# Patient Record
Sex: FEMALE | Race: WHITE
[De-identification: names, ages, dates, MRNs, and addresses within clinical notes are randomized per-mention and may not be internally consistent; named-entity substitution may affect disease eponyms.]

---

## 2018-01-10 ENCOUNTER — HOSPITAL ENCOUNTER (INPATIENT)
Dept: HOSPITAL 45 - C.EDA | Age: 78
LOS: 6 days | Discharge: NURSING, REHAB OR LONG-TERM CARE | DRG: 62 | End: 2018-01-16
Attending: HOSPITALIST | Admitting: HOSPITALIST
Payer: COMMERCIAL

## 2018-01-10 VITALS — HEART RATE: 82 BPM | OXYGEN SATURATION: 94 % | SYSTOLIC BLOOD PRESSURE: 135 MMHG | DIASTOLIC BLOOD PRESSURE: 64 MMHG

## 2018-01-10 VITALS — SYSTOLIC BLOOD PRESSURE: 126 MMHG | HEART RATE: 68 BPM | OXYGEN SATURATION: 95 % | DIASTOLIC BLOOD PRESSURE: 72 MMHG

## 2018-01-10 VITALS
HEART RATE: 85 BPM | OXYGEN SATURATION: 89 % | DIASTOLIC BLOOD PRESSURE: 79 MMHG | SYSTOLIC BLOOD PRESSURE: 154 MMHG | TEMPERATURE: 97.7 F

## 2018-01-10 VITALS — HEART RATE: 83 BPM | OXYGEN SATURATION: 95 % | SYSTOLIC BLOOD PRESSURE: 153 MMHG | DIASTOLIC BLOOD PRESSURE: 91 MMHG

## 2018-01-10 VITALS
WEIGHT: 167.33 LBS | HEIGHT: 62 IN | WEIGHT: 167.33 LBS | HEIGHT: 62 IN | BODY MASS INDEX: 30.79 KG/M2 | BODY MASS INDEX: 30.79 KG/M2

## 2018-01-10 VITALS — SYSTOLIC BLOOD PRESSURE: 165 MMHG | HEART RATE: 80 BPM | OXYGEN SATURATION: 94 % | DIASTOLIC BLOOD PRESSURE: 78 MMHG

## 2018-01-10 VITALS — DIASTOLIC BLOOD PRESSURE: 77 MMHG | OXYGEN SATURATION: 89 % | HEART RATE: 80 BPM | SYSTOLIC BLOOD PRESSURE: 159 MMHG

## 2018-01-10 VITALS — HEART RATE: 94 BPM | DIASTOLIC BLOOD PRESSURE: 94 MMHG | OXYGEN SATURATION: 98 % | SYSTOLIC BLOOD PRESSURE: 153 MMHG

## 2018-01-10 VITALS — DIASTOLIC BLOOD PRESSURE: 80 MMHG | OXYGEN SATURATION: 97 % | SYSTOLIC BLOOD PRESSURE: 146 MMHG | HEART RATE: 81 BPM

## 2018-01-10 VITALS — SYSTOLIC BLOOD PRESSURE: 131 MMHG | DIASTOLIC BLOOD PRESSURE: 64 MMHG | OXYGEN SATURATION: 97 % | HEART RATE: 74 BPM

## 2018-01-10 VITALS — HEART RATE: 92 BPM | SYSTOLIC BLOOD PRESSURE: 162 MMHG | DIASTOLIC BLOOD PRESSURE: 96 MMHG

## 2018-01-10 VITALS — OXYGEN SATURATION: 98 % | DIASTOLIC BLOOD PRESSURE: 78 MMHG | TEMPERATURE: 98.42 F | SYSTOLIC BLOOD PRESSURE: 163 MMHG

## 2018-01-10 VITALS — OXYGEN SATURATION: 99 %

## 2018-01-10 DIAGNOSIS — E87.2: ICD-10-CM

## 2018-01-10 DIAGNOSIS — G81.94: ICD-10-CM

## 2018-01-10 DIAGNOSIS — N18.3: ICD-10-CM

## 2018-01-10 DIAGNOSIS — I73.9: ICD-10-CM

## 2018-01-10 DIAGNOSIS — F17.200: ICD-10-CM

## 2018-01-10 DIAGNOSIS — R29.704: ICD-10-CM

## 2018-01-10 DIAGNOSIS — Z82.49: ICD-10-CM

## 2018-01-10 DIAGNOSIS — E78.5: ICD-10-CM

## 2018-01-10 DIAGNOSIS — K21.9: ICD-10-CM

## 2018-01-10 DIAGNOSIS — M54.9: ICD-10-CM

## 2018-01-10 DIAGNOSIS — I63.9: Primary | ICD-10-CM

## 2018-01-10 DIAGNOSIS — Z79.82: ICD-10-CM

## 2018-01-10 DIAGNOSIS — I12.9: ICD-10-CM

## 2018-01-10 LAB
BASOPHILS # BLD: 0.04 K/UL (ref 0–0.2)
BASOPHILS NFR BLD: 0.2 %
BUN SERPL-MCNC: 16 MG/DL (ref 7–18)
CALCIUM SERPL-MCNC: 8.9 MG/DL (ref 8.5–10.1)
CK MB SERPL-MCNC: 0.5 NG/ML (ref 0.5–3.6)
CO2 SERPL-SCNC: 29 MMOL/L (ref 21–32)
CREAT SERPL-MCNC: 0.85 MG/DL (ref 0.6–1.2)
EOS ABS #: 0.05 K/UL (ref 0–0.5)
EOSINOPHIL NFR BLD AUTO: 262 K/UL (ref 130–400)
EOSINOPHIL NFR BLD AUTO: 279 K/UL (ref 130–400)
GLUCOSE SERPL-MCNC: 142 MG/DL (ref 70–99)
HCT VFR BLD CALC: 41.2 % (ref 37–47)
HCT VFR BLD CALC: 41.6 % (ref 37–47)
HGB BLD-MCNC: 13.9 G/DL (ref 12–16)
HGB BLD-MCNC: 14 G/DL (ref 12–16)
IG#: 0.09 K/UL (ref 0–0.02)
IMM GRANULOCYTES NFR BLD AUTO: 6.3 %
INR PPP: 1 (ref 0.9–1.1)
LYMPHOCYTES # BLD: 1.28 K/UL (ref 1.2–3.4)
MCH RBC QN AUTO: 31.4 PG (ref 25–34)
MCH RBC QN AUTO: 31.7 PG (ref 25–34)
MCHC RBC AUTO-ENTMCNC: 33.4 G/DL (ref 32–36)
MCHC RBC AUTO-ENTMCNC: 34 G/DL (ref 32–36)
MCV RBC AUTO: 93.2 FL (ref 80–100)
MCV RBC AUTO: 93.9 FL (ref 80–100)
MONO ABS #: 3.87 K/UL (ref 0.11–0.59)
MONOCYTES NFR BLD: 19 %
NEUT ABS #: 15.06 K/UL (ref 1.4–6.5)
NEUTROPHILS # BLD AUTO: 0.2 %
NEUTROPHILS NFR BLD AUTO: 73.9 %
PMV BLD AUTO: 11.3 FL (ref 7.4–10.4)
PMV BLD AUTO: 11.4 FL (ref 7.4–10.4)
POTASSIUM SERPL-SCNC: 4.4 MMOL/L (ref 3.5–5.1)
PTT PATIENT: 29.8 SECONDS (ref 21–31)
RED CELL DISTRIBUTION WIDTH CV: 14.3 % (ref 11.5–14.5)
RED CELL DISTRIBUTION WIDTH CV: 14.5 % (ref 11.5–14.5)
RED CELL DISTRIBUTION WIDTH SD: 48.5 FL (ref 36.4–46.3)
RED CELL DISTRIBUTION WIDTH SD: 49.8 FL (ref 36.4–46.3)
SODIUM SERPL-SCNC: 137 MMOL/L (ref 136–145)
WBC # BLD AUTO: 20.39 K/UL (ref 4.8–10.8)
WBC # BLD AUTO: 20.88 K/UL (ref 4.8–10.8)

## 2018-01-10 RX ADMIN — GABAPENTIN SCH MG: 300 CAPSULE ORAL at 21:00

## 2018-01-10 NOTE — DIAGNOSTIC IMAGING REPORT
HEAD WITHOUT CONTRAST (CT)



CT DOSE: 638.56 mGycm



HISTORY: Mental status change  Stroke



TECHNIQUE: Multiaxial CT images of the head were performed without the use of

intravenous contrast.  A dose lowering technique was utilized adhering to the

principles of ALARA.





Comparison: None.



Findings: The paranasal sinuses and mastoid air cells are clear. The calvarium

and skull base are intact. The ventricles and sulci are within normal limits.

There is no mass, hematoma, midline shift, or acute infarct.



Impression:

No acute intracranial abnormality. Mild age-related atrophy and chronic small

vessel change











The above report was generated using voice recognition software.  It may contain

grammatical, syntax or spelling errors.







Electronically signed by:  Chava Figueroa M.D.

1/10/2018 11:34 AM



Dictated Date/Time:  1/10/2018 11:33 AM

## 2018-01-10 NOTE — DIAGNOSTIC IMAGING REPORT
ULTRASOUND OF THE CAROTID ARTERIES



CLINICAL HISTORY: Stroke    



COMPARISON STUDY: 8/6/2013, MR angiography neck dated 1/10/2018



TECHNIQUE: Real-time, grayscale, and color Doppler sonography of the carotid

arteries was performed. Imaging reviewed in the transverse and longitudinal

planes. NASCET criteria was utilized for stenosis calcification.



FINDINGS:



There is a high bifurcation. The distal internal carotid arteries were difficult

to delineate. Right external carotid artery was nonvisualized.



There is mild atherosclerotic plaque present    . 

The peak systolic velocity within the right internal carotid artery is 151

cm/sec.

The systolic velocity ratio of right internal to common carotid artery is 2.4.

The peak systolic velocity within the left internal carotid artery is 106

cm/sec.

The systolic velocity ratio left internal to common carotid artery is 1.5.



Antegrade flow is seen in the vertebral arteries. The external carotid arteries

are patent.



Blood pressure in the right arm measured 169 mm/Hg.  Blood pressure in the left

arm measured 179 mm/Hg.



There are mildly elevated velocities of the right internal carotid artery. The

waveforms however appear otherwise unremarkable and on grayscale, no stenosis is

visualized. No turbulence is visualized on color flow. There is no corresponding

stenosis on the CT angiogram performed the same day. A hemodynamically

significant stenosis is therefore not felt to be present.





IMPRESSION: No evidence of unilaterally significant stenosis. Please see above

discussion.







Electronically signed by:  Luke Engel M.D.

1/10/2018 5:37 PM



Dictated Date/Time:  1/10/2018 5:31 PM

## 2018-01-10 NOTE — DIAGNOSTIC IMAGING REPORT
NECK CTA



HISTORY: Left upper extremity weakness. Assess for stroke.     



TECHNIQUE: Multiaxial CT images of the neck were performed following the

intravenous administration of contrast to evaluate the major cervical vessels.

Maximum intensity projection images were also obtained. All measurements were

calculated based on NASCET criteria.  A dose lowering technique was utilized

adhering to the principles of ALARA.



COMPARISON STUDY:  Head CT 1/10/2018. Carotid Doppler 8/6/2013.



FINDINGS: The aortic arch is normal in caliber. Focal mild narrowing of

approximately 30% at the takeoff of the left subclavian artery. Otherwise, the

remaining great vessels are patent. Mild calcified plaque seen within the

bilateral carotid bifurcations without significant stenosis. Tortuous bilateral

mid internal carotid arteries. Of note, the majority of the external carotid

artery branches originate from the mid right internal carotid artery consistent

with a high bifurcation. There is mild calcified plaque at this location. The

bilateral common carotid arteries are widely patent. No significant stenosis

within the bilateral vertebral arteries.



IMPRESSION:  

No significant stenosis, occlusion, or dissection identified within the carotid

or vertebral arteries. Incidental is made of a high right carotid bifurcation.







Electronically signed by:  Armando Henderson M.D.

1/10/2018 11:47 AM



Dictated Date/Time:  1/10/2018 11:41 AM

## 2018-01-10 NOTE — DIAGNOSTIC IMAGING REPORT
Study: CT angiography of the chest



HISTORY: Pain. Mental status change.



Findings. Generalized atherosclerotic change and ectasia thoracic aorta. No

evidence for true aneurysm or dissection.



Several small mediastinal and/or hilar nodes. Mild interstitial prominence

throughout both hemithoraces suggesting nonspecific interstitial pneumonitis. No

consolidative infiltrative changes. Minimal dependent basilar atelectatic

change. Moderate degenerative disc change of the thoracic region. No evidence

for compression deformity.



IMPRESSION: No evidence for aneurysm or dissection.

2. Moderate atherosclerotic change thoracic aorta.

3. Slight generalized interstitial prominence throughout both hemithoraces

raising the possibility of a mild diffuse interstitial pneumonitis.







Electronically signed by:  Chava Figueroa M.D.

1/10/2018 11:46 AM



Dictated Date/Time:  1/10/2018 11:39 AM

## 2018-01-10 NOTE — DIAGNOSTIC IMAGING REPORT
MRI OF THE BRAIN WITHOUT AND WITH IV CONTRAST



CLINICAL HISTORY: Stroke.    



COMPARISON STUDY:  Head CT and CTA of the head January 10, 2018 and MRI of the

brain August 7, 2013. 



TECHNIQUE:  Utilizing a 1.5 Kanika magnet and dedicated coil, multiplanar,

multiecho imaging of the brain was performed pre and postcontrast

administration.  IV administration of 8 mL of Gadavist contrast was uneventful.



FINDINGS: There are are multiple small foci of restricted diffusion within the

right MCA territory, involving the right frontal and parietal lobes. The largest

focus is a 2.1 cm infarct within the right parietal lobe which involves the

postcentral gyrus. There is mild corresponding signal abnormality. There is no

significant mass effect with there is no evidence for hemorrhagic conversion.

Ventricular system is unremarkable for age. Basilar cisterns are patent. There

are no extra-axial collections. Flow-voids for the major intracranial vessels

are present. Postcontrast images are compromised by motion artifact but no mass

or pathologic enhancement is evident. Calvarial signal is unremarkable. Orbits

are unremarkable.



IMPRESSION:  Multiple small foci of acute infarction within the right frontal

and parietal lobes, measuring up to 2.1 cm. No significant mass effect. No

evidence for hemorrhagic conversion.







Electronically signed by:  Broderick Nolan M.D.

1/10/2018 8:05 PM



Dictated Date/Time:  1/10/2018 8:00 PM

## 2018-01-10 NOTE — HISTORY AND PHYSICAL
History & Physical


Date & Time of Service:


Lopez 10, 2018 at 14:04


Chief Complaint:


Back Pain


Primary Care Physician:


ANGELIQUE Burgess M.D. (MEDICAL)


History of Present Illness


Source:  patient


Patient is a 77 yr female with PMH of PVD, HLP, HTN, GERD, CKD III and other 

problems presents with history of back pain and LUE weakness which started 

today. Patient reports that she was trying bend over to  a tissue from 

the floor and developed sudden onset of severe back pain in the middle of her 

back and also noticed to have Left hand weakness at around 9.30am. She sates 

that she was not able to move her fingers and "I felt funny". Patient states 

back pain is non radiating, dull, constant which improved while in ED and has 

history of chronic back pain. She reports nausea but denies vomiting. Patient 

had CT head which showed no acute findings and ED physician discussed with  

[facility]  and patient received tPA while in ED. 

Patient later showed some improvement of LUE weakness post tPA. Denies any 

history of chest pain, SOB, dizziness, cough, fever, chills, fall, head trauma, 

LOC, headache, change in vision, double/blurry vision, vertigo, slurred speech, 

facial deformity, bowel/bladder incontinence, abdominal pain, diarrhea, 

dysuria. She is on Aspirin at home and admits to taking it today





Past Medical/Surgical History


Medical Problems:


(1) Dyslipidemia


Status: Chronic  





(2) Gastro-esophageal reflux


Status: Chronic  





(3) HTN (hypertension)


Status: Chronic  





(4) Hypertension


Status: Chronic  





(5) PAD (peripheral artery disease)


Status: Chronic  





(6) Peripheral vascular disease


Status: Chronic  





Surgical Problems:


(1) H/O bilateral oophorectomy


Status: Resolved  





(2) H/O left breast biopsy


Status: Resolved  





(3) H/O oophorectomy


Status: Resolved  





(4) History of appendectomy


Status: Resolved  





(5) History of cataract surgery


Status: Resolved  





(6) History of colonoscopy


Status: Resolved  





(7) Hx of appendectomy


Status: Resolved  





(8) S/P femoral-popliteal bypass surgery


Status: Resolved  








Family History





Asthma


  BROTHER


FH: CAD (coronary artery disease)


  FATHER


FH: CVA (cerebrovascular accident)


FH: arthritis


  FATHER


  MOTHER


  BROTHER


  SISTER


FH: cancer


  SISTER (esophageal)


FH: diabetes mellitus


  BROTHER


FH: esophageal cancer


Stroke


  FATHER


  MOTHER


Reviewed





Social History


Smoking Status:  Current Every Day Smoker


Alcohol Use:  none


Drug Use:  none


Marital Status:  


Housing status:  lives with family


Occupational Status:  retired





Immunizations


History of Influenza Vaccine:  Yes


Influenza Vaccine Date:  Jan 6, 2013


History of Tetanus Vaccine?:  Yes


Tetanus Immunization Date:  May 14, 2002


History of Pneumococcal:  Yes


Pneumococcal Date:  Jun 6, 2013


History of Hepatitis B Vaccine:  Unknown


Hepatitis Immunization Date:  May 14, 2002





Multi-Drug Resistant Organisms


History of MDRO:  No





Allergies


Coded Allergies:  


     Adhesives (Verified  Allergy, Unknown, ., 1/10/18)


     Benzoin (Verified  Allergy, Unknown, ., 1/10/18)


     Hydrogen Peroxide (Verified  Allergy, Unknown, ., 1/10/18)


     Ceftriaxone (Unverified  Adverse Reaction, Unknown, burning at site of 

injection, 1/10/18)





Home Medications


Scheduled


Amlodipine (Norvasc), 5 MG PO DAILY


Aspirin (Aspirin Ec), 81 MG PO DAILY


Folic Acid (Folvite), 1 MG PO DAILY


Gabapentin (Neurontin), 300-600 MG PO TID


Lisinopril (Zestril), 40 MG PO TID


Metoprolol Succinate (Metoprolol Succinate ER), 25 MG PO QAM


Multiple Vitamins W/ Minerals (Thera-M), 1 TAB PO DAILY


Omeprazole (Prilosec), 20 MG PO DAILY


Simvastatin (Zocor), 40 MG PO QPM





Scheduled PRN


Acetaminophen (Acetaminophen), 650 MG PO Q4H PRN for Mild Pain


Albuterol Hfa (Ventolin Hfa), 2-4 PUFFS INH Q6H PRN for SOB/Wheezing





Review of Systems


See HPI for pertinent positives & negatives. A total of 10 systems reviewed and 

were otherwise negative.





Physical Exam


Vital Signs











  Date Time  Temp Pulse Resp B/P (MAP) Pulse Ox O2 Delivery O2 Flow Rate FiO2


 


1/10/18 13:45  82 16 130/80 98 Nasal Cannula 3.0 


 


1/10/18 13:30  85 14 159/80 96 Nasal Cannula 3.0 


 


1/10/18 13:28     87 Room Air  


 


1/10/18 13:15  87 15 168/92 95 Room Air  


 


1/10/18 13:04  74      


 


1/10/18 13:00  75 17 176/103 96 Room Air  


 


1/10/18 12:45  77 17 167/79 94 Room Air  


 


1/10/18 12:31  77 14 165/78 94 Room Air  


 


1/10/18 12:22  84 30 166/82 94 Room Air  


 


1/10/18 12:05  83 16 145/79 93 Room Air  


 


1/10/18 12:00  80 22 159/109 92 Room Air  


 


1/10/18 11:43     93 Room Air  


 


1/10/18 11:35  86 18 148/100 93 Room Air  


 


1/10/18 10:52  85      


 


1/10/18 10:48 37.0 77 18 154/81 98 Room Air  








General Appearance:  WD/WN, no apparent distress


Head:  normocephalic, atraumatic


Eyes:  normal inspection, PERRL, EOMI, sclerae normal


ENT:  normal ENT inspection, hearing grossly normal


Neck:  supple, trachea midline


Respiratory/Chest:  chest non-tender, lungs clear, no respiratory distress, no 

accessory muscle use, + decreased breath sounds


Cardiovascular:  regular rate, rhythm, no edema, no murmur


Abdomen/GI:  normal bowel sounds, non tender, soft


Back:  + pertinent finding (Mid thoracic tenderness of spine)


Extremities/Musculoskelatal:  normal inspection, no pedal edema


Neurologic/Psych:  CNs II-XII nml as tested, alert, normal mood/affect, 

oriented x 3, + pertinent finding (Left upper extrtemity weakness, sensation 

intact)


Skin:  normal color, warm/dry





Diagnostics


Laboratory Results





Results Past 24 Hours








Test


  1/10/18


11:14 1/10/18


11:51 1/10/18


13:05 Range/Units


 


 


Bedside Prothrombin Time INR 1.1   0.9-1.1  


 


White Blood Count  20.39  4.8-10.8  K/uL


 


Red Blood Count  4.43  4.2-5.4  M/uL


 


Hemoglobin  13.9  12.0-16.0  g/dL


 


Hematocrit  41.6  37-47  %


 


Mean Corpuscular Volume  93.9    fL


 


Mean Corpuscular Hemoglobin  31.4  25-34  pg


 


Mean Corpuscular Hemoglobin


Concent 


  33.4


  


  32-36  g/dl


 


 


Platelet Count  279  130-400  K/uL


 


Mean Platelet Volume  11.4  7.4-10.4  fL


 


Neutrophils (%) (Auto)  73.9   %


 


Lymphocytes (%) (Auto)  6.3   %


 


Monocytes (%) (Auto)  19.0   %


 


Eosinophils (%) (Auto)  0.2   %


 


Basophils (%) (Auto)  0.2   %


 


Neutrophils # (Auto)  15.06  1.4-6.5  K/uL


 


Lymphocytes # (Auto)  1.28  1.2-3.4  K/uL


 


Monocytes # (Auto)  3.87  0.11-0.59  K/uL


 


Eosinophils # (Auto)  0.05  0-0.5  K/uL


 


Basophils # (Auto)  0.04  0-0.2  K/uL


 


RDW Standard Deviation  49.8  36.4-46.3  fL


 


RDW Coefficient of Variation  14.5  11.5-14.5  %


 


Immature Granulocyte % (Auto)  0.4   %


 


Immature Granulocyte # (Auto)  0.09  0.00-0.02  K/uL


 


Hypersegmented Polys  1+   


 


Prothrombin Time


  


  10.7


  


  9.0-12.0


SECONDS


 


Prothromb Time International


Ratio 


  1.0


  


  0.9-1.1  


 


 


Activated Partial


Thromboplast Time 


  29.8


  


  21.0-31.0


SECONDS


 


Partial Thromboplastin Ratio  1.1   


 


Sodium Level  137  136-145  mmol/L


 


Potassium Level  4.4  3.5-5.1  mmol/L


 


Chloride Level  103    mmol/L


 


Carbon Dioxide Level  29  21-32  mmol/L


 


Anion Gap  5.0  3-11  mmol/L


 


Blood Urea Nitrogen  16  7-18  mg/dl


 


Creatinine


  


  0.85


  


  0.60-1.20


mg/dl


 


Est Creatinine Clear Calc


Drug Dose 


  56.5


  


   ml/min


 


 


Estimated GFR (


American) 


  77.7


  


   


 


 


Estimated GFR (Non-


American 


  67.0


  


   


 


 


BUN/Creatinine Ratio  18.9  10-20  


 


Random Glucose  142  70-99  mg/dl


 


Calcium Level  8.9  8.5-10.1  mg/dl


 


Magnesium Level  2.1  1.8-2.4  mg/dl


 


Total Creatine Kinase  32    U/L


 


Creatine Kinase MB  0.5  0.5-3.6  ng/ml


 


Creatine Kinase MB Ratio  1.6  0-3.0  


 


Troponin I  0.031  0-0.045  ng/ml


 


Urine Color   YELLOW  


 


Urine Appearance   CLEAR CLEAR  


 


Urine pH   8.0 4.5-7.5  


 


Urine Specific Gravity   1.038 1.000-1.030  


 


Urine Protein   NEG NEG  


 


Urine Glucose (UA)   NEG NEG  


 


Urine Ketones   NEG NEG  


 


Urine Occult Blood   1+ NEG  


 


Urine Nitrite   NEG NEG  


 


Urine Bilirubin   NEG NEG  


 


Urine Urobilinogen   NEG NEG  


 


Urine Leukocyte Esterase   NEG NEG  


 


Urine WBC (Auto)   0 0-5  /hpf


 


Urine RBC (Auto)   10-30 0-4  /hpf


 


Urine Hyaline Casts (Auto)   0 0-5  /lpf


 


Urine Epithelial Cells (Auto)   20-30 0-5  /lpf


 


Urine Bacteria (Auto)   NEG NEG  











Diagnostic Radiology


CT Head:


No acute intracranial abnormality. Mild age-related atrophy and chronic small


vessel change





CXR:


1. Mild diffuse interstitial thickening which could be chronic.


2. The heart remains top normal in size.





CT dissection:


No evidence for aneurysm or dissection.


2. Moderate atherosclerotic change thoracic aorta.


3. Slight generalized interstitial prominence throughout both hemithoraces


raising the possibility of a mild diffuse interstitial pneumonitis.








CT angio Brain:


1. There is no hemorrhage, mass effect, or evidence of acute territorial


ischemia by CT criteria.


2. Unremarkable CT angiogram of the brain.





CTA neck:


No significant stenosis, occlusion, or dissection identified within the carotid


or vertebral arteries. Incidental is made of a high right carotid bifurcation.





ECHO:


Left ventricular systolic function is normal.


  *  Ejection Fraction = 65-70%.


  *  There is mild concentric left ventricular hypertrophy.


  *  The left atrium is moderately dilated.


  *  There is severe mitral annular calcification.


  *  There is mild mitral regurgitation.


  *  Aortic valve sclerosis moderate, without significant aortic valvular 

stenosis.


  *  Grade I diastolic dysfunction, (abnormal relaxation pattern).





EKG


EKG:


NSR, Peaked T waves in I, II, V3, V4, V5, Non specific ST changes in inferior 

lead





Impression


Assessment and Plan








Acute CVA: S/P tPA


Admit in ICU 


CT head: showed no acute pathology


CTA Head, neck: No acute process


CT dissection: Negative


Stroke work up including lipid panel, A1C, MRI Brain, ECHO


Speech and swallow eval


Hold aspirin as S/P tPA


Continue Lipitor


Neuro checks, Neurology/Intensivist consulted


PT/OT    


Allow permissive HTN in setting of acute CVA 


Repeat CT head in AM


Avoid antiplatelets, anticoagulants for now 











Back Pain:


H/O chronic back pain


CT dissection: Negative


Will get X ray of spine


Pain control


PT/OT


Consider CT if necessary





 


Leukocytosis/Lactic acidosis:


No obvious source of infection


Normal Procalcitonin


CXR:Mild diffuse interstitial thickening which could be chronic


UA: no signs of UTI


IV fluids


Repeat Lactate levels


No Abx for now








Abnormal ECG:


ST -T wave changes likely secondary to CVA


No regional wall motion abnormalities noted on ECHO


Troponin: negative


Trend cardiac enzymes





PVD/ HLP:


Hold Aspirin


Continue Lipitor








HTN:


Hold HTN meds for permissive HTN








GERD


Continue PPI





CKD III


Cr at  baseline


monitor renal function





Ongoing Tobacco use:


 to quit smoking








DVT Px:


SCDs Re: tPA





Code Status:


Full Code





Disposition:


Admit in ICU

## 2018-01-10 NOTE — EMERGENCY ROOM VISIT NOTE
History


Report prepared by Phillip:  Corey Johnson


Under the Supervision of:  Dr. Russell Jay D.O.


First contact with patient:  10:51


Chief Complaint:  BACK PAIN


Stated Complaint:  BACK PAIN





History of Present Illness


The patient is a 77 year old female via EMS who presents to the Emergency Room 

with complaints of persistent back pain this morning. Per the medics, the 

patient went to bend over, and has had persistent back pain ever since then. 

She states that the pain is across the middle of her back, and she has had back 

pain before. She describes her pain as an "ache". The patient adds that she 

then went to  a tissue with her left hand an hour and a half ago, and 

was unable to use her left hand. She states that she was able to use the left 

hand when she woke up, and the back pain started before the loss of use of the 

left hand. The patient denies any chest pain or shortness of breath. She notes 

no history of a stroke. She says that she is not on any blood thinners.





   Source of History:  patient, EMS, nursing staff


   Onset:  This morning


   Position:  back


   Quality:  ache, other (pain)


   Timing:  other (persistent)


   Associated Symptoms:  No chest pain, No SOB


Note:


Associated symptoms: Loss of use of left hand starting around an hour and a 

half ago.





Review of Systems


See HPI for pertinent positives & negatives. A total of 10 systems reviewed and 

were otherwise negative.





Past Medical & Surgical


Medical Problems:


(1) Dyslipidemia


(2) Gastro-esophageal reflux


(3) HTN (hypertension)


(4) Hypertension


(5) PAD (peripheral artery disease)


(6) Peripheral vascular disease


(7) SOB (shortness of breath)


Surgical Problems:


(1) H/O bilateral oophorectomy


(2) H/O left breast biopsy


(3) H/O oophorectomy


(4) History of appendectomy


(5) History of cataract surgery


(6) History of colonoscopy


(7) Hx of appendectomy


(8) S/P femoral-popliteal bypass surgery








Family History





Asthma


  BROTHER


FH: CAD (coronary artery disease)


  FATHER


FH: CVA (cerebrovascular accident)


FH: arthritis


  FATHER


  MOTHER


  BROTHER


  SISTER


FH: cancer


  SISTER (esophageal)


FH: diabetes mellitus


  BROTHER


FH: esophageal cancer


Stroke


  FATHER


  MOTHER





Social History


Smoking Status:  Current Every Day Smoker


Alcohol Use:  none


Drug Use:  none


Marital Status:  


Housing Status:  lives with family


Occupation Status:  retired





Current/Historical Medications


Scheduled


Amlodipine (Norvasc), 5 MG PO DAILY


Aspirin (Aspirin Ec), 81 MG PO DAILY


Folic Acid (Folvite), 1 MG PO DAILY


Gabapentin (Neurontin), 300-600 MG PO TID


Lisinopril (Zestril), 40 MG PO TID


Metoprolol Succinate (Metoprolol Succinate ER), 25 MG PO QAM


Multiple Vitamins W/ Minerals (Thera-M), 1 TAB PO DAILY


Omeprazole (Prilosec), 20 MG PO DAILY


Simvastatin (Zocor), 40 MG PO QPM





Scheduled PRN


Acetaminophen (Acetaminophen), 650 MG PO Q4H PRN for Mild Pain


Albuterol Hfa (Ventolin Hfa), 2-4 PUFFS INH Q6H PRN for SOB/Wheezing





Allergies


Coded Allergies:  


     Adhesives (Verified  Allergy, Unknown, ., 1/10/18)


     Benzoin (Verified  Allergy, Unknown, ., 1/10/18)


     Hydrogen Peroxide (Verified  Allergy, Unknown, ., 1/10/18)


     Ceftriaxone (Unverified  Adverse Reaction, Unknown, burning at site of 

injection, 1/10/18)





Physical Exam


Vital Signs











  Date Time  Temp Pulse Resp B/P (MAP) Pulse Ox O2 Delivery O2 Flow Rate FiO2


 


1/10/18 15:00  81 18 153/69 97 Room Air  


 


1/10/18 14:30  77 18 175/78 99 Nasal Cannula 3.0 


 


1/10/18 14:15  81 18 177/76 96 Nasal Cannula 3.0 


 


1/10/18 14:15     99 Nasal Cannula 3.0 


 


1/10/18 14:01  168      


 


1/10/18 14:01  72      


 


1/10/18 14:00  84 16 148/85 98 Nasal Cannula 3.0 


 


1/10/18 13:45  82 16 130/80 98 Nasal Cannula 3.0 


 


1/10/18 13:30  85 14 159/80 96 Nasal Cannula 3.0 


 


1/10/18 13:28     87 Room Air  


 


1/10/18 13:15  87 15 168/92 95 Room Air  


 


1/10/18 13:04  74      


 


1/10/18 13:00  75 17 176/103 96 Room Air  


 


1/10/18 12:45  77 17 167/79 94 Room Air  


 


1/10/18 12:31  77 14 165/78 94 Room Air  


 


1/10/18 12:22  84 30 166/82 94 Room Air  


 


1/10/18 12:05  83 16 145/79 93 Room Air  


 


1/10/18 12:00  80 22 159/109 92 Room Air  


 


1/10/18 11:43     93 Room Air  


 


1/10/18 11:35  86 18 148/100 93 Room Air  


 


1/10/18 10:52  85      


 


1/10/18 10:48 37.0 77 18 154/81 98 Room Air  











Physical Exam


GENERAL:  Patient is awake, alert, somewhat anxious appearing.


EYES: The conjunctivae are clear.  The pupils are round and reactive. 


EARS, NOSE, MOUTH AND THROAT: The nose is without any evidence of any 

deformity. Mucous membranes are moist tongue is midline 


NECK: The neck is nontender and supple.


RESPIRATORY: Normal respiratory effort is noted there is no evidence of 

wheezing rhonchi or rales


CARDIOVASCULAR:  Regular rate and rhythm noted there no murmurs rubs or gallops 

normal S1 normal S2 


GASTROINTESTINAL: The abdomen is soft. Bowel sounds are present in all 

quadrants. Abdomen is nontender


BACK:  No midline tenderness or or step-off noted. Range of motion appeared 

intact.


MUSCULOSKELETAL/EXTREMITIES: There is no evidence of gross deformity full range 

of motion is noted in the hips and shoulders


SKIN: There is no obvious evidence of any rash. There are no petechiae, pallor 

or cyanosis noted. 


NEUROLOGIC:  Patient is awake alert and oriented x3, no facial droop noted. 

There was a drift in the left upper extremity. Patient was able to hold each 

leg off the bed for 5 seconds.





Medical Decision & Procedures


ER Provider


Diagnostic Interpretation:


Radiology results as stated below per my review and radiologist interpretation:








HEAD WITHOUT CONTRAST (CT)





CT DOSE: 638.56 mGycm





HISTORY: Mental status change  Stroke





TECHNIQUE: Multiaxial CT images of the head were performed without the use of


intravenous contrast.  A dose lowering technique was utilized adhering to the


principles of ALARA.








Comparison: None.





Findings: The paranasal sinuses and mastoid air cells are clear. The calvarium


and skull base are intact. The ventricles and sulci are within normal limits.


There is no mass, hematoma, midline shift, or acute infarct.





Impression:


No acute intracranial abnormality. Mild age-related atrophy and chronic small


vessel change

















The above report was generated using voice recognition software.  It may contain


grammatical, syntax or spelling errors.





Electronically signed by:  Chava Figueroa M.D.


1/10/2018 11:34 AM





Dictated Date/Time:  1/10/2018 11:33 AM











Study: CT angiography of the chest





HISTORY: Pain. Mental status change.





Findings. Generalized atherosclerotic change and ectasia thoracic aorta. No


evidence for true aneurysm or dissection.





Several small mediastinal and/or hilar nodes. Mild interstitial prominence


throughout both hemithoraces suggesting nonspecific interstitial pneumonitis. No


consolidative infiltrative changes. Minimal dependent basilar atelectatic


change. Moderate degenerative disc change of the thoracic region. No evidence


for compression deformity.





IMPRESSION: No evidence for aneurysm or dissection.


2. Moderate atherosclerotic change thoracic aorta.


3. Slight generalized interstitial prominence throughout both hemithoraces


raising the possibility of a mild diffuse interstitial pneumonitis.





Electronically signed by:  Chava Figueroa M.D.


1/10/2018 11:46 AM





Dictated Date/Time:  1/10/2018 11:39 AM











CT ANGIOGRAM OF THE BRAIN 





CLINICAL HISTORY: Left arm weakness.





COMPARISON STUDY:  CT of the brain dated 1/10/2018.





TECHNIQUE: Following the IV administration of 94 cc of Optiray 320, CT angiogram


of the brain was performed from the skull base to the vertex. Images are


reviewed in the axial, sagittal, and coronal planes. 3-D MIPS images are created


and assessed. IV contrast was administered without complication.  A dose


lowering technique was utilized adhering to the principles of ALARA.





FINDINGS:





Brain parenchyma: There are age-related involutional changes noting mild


subcortical and periventricular microangiopathic disease. There is no


hemorrhage, mass effect, or evidence of acute territorial ischemia by CT


criteria. There is no evidence of enhancing mass lesion on the angiogram phase


images. No extra-axial fluid collection is seen. Gray-white matter


differentiation is preserved.





Ventricles, sulci, and cisterns: Prominent secondary to involutional change. 





CT angiogram of the brain: There is atherosclerotic calcification of the


cavernous carotid arteries. There is a right posterior communicating artery. The


internal carotid arteries are widely patent, as are the anterior and middle


cerebral arteries. The vertebrobasilar system and posterior cerebral arteries


are widely patent. The vertebral arteries are codominant. There is no aneurysm,


high-grade stenosis, or focal vessel cutoff identified throughout the


intracranial circulation.





Dural sinuses: Clear as visualized.





Orbits: The bony orbits are intact. The orbital contents are normal as


visualized noting bilateral ocular lens implants.





Sinuses and mastoids: Trace mucosal thickening is seen in the left maxillary


antrum. The remaining paranasal sinuses are clear. The mastoid air cells are


well pneumatized.





Calvarium: The skeletal structures are osteopenic. The calvarium appears intact.





Soft tissues: A small lipoma is noted in the right frontal scalp on image #131.








IMPRESSION: 





1. There is no hemorrhage, mass effect, or evidence of acute territorial


ischemia by CT criteria.





2. Unremarkable CT angiogram of the brain.





Electronically signed by:  Alvaro Carlson M.D.


1/10/2018 11:52 AM





Dictated Date/Time:  1/10/2018 11:46 AM











NECK CTA





HISTORY: Left upper extremity weakness. Assess for stroke.     





TECHNIQUE: Multiaxial CT images of the neck were performed following the


intravenous administration of contrast to evaluate the major cervical vessels.


Maximum intensity projection images were also obtained. All measurements were


calculated based on NASCET criteria.  A dose lowering technique was utilized


adhering to the principles of ALARA.





COMPARISON STUDY:  Head CT 1/10/2018. Carotid Doppler 8/6/2013.





FINDINGS: The aortic arch is normal in caliber. Focal mild narrowing of


approximately 30% at the takeoff of the left subclavian artery. Otherwise, the


remaining great vessels are patent. Mild calcified plaque seen within the


bilateral carotid bifurcations without significant stenosis. Tortuous bilateral


mid internal carotid arteries. Of note, the majority of the external carotid


artery branches originate from the mid right internal carotid artery consistent


with a high bifurcation. There is mild calcified plaque at this location. The


bilateral common carotid arteries are widely patent. No significant stenosis


within the bilateral vertebral arteries.





IMPRESSION:  


No significant stenosis, occlusion, or dissection identified within the carotid


or vertebral arteries. Incidental is made of a high right carotid bifurcation.








Electronically signed by:  Armando Henderson M.D.


1/10/2018 11:47 AM





Dictated Date/Time:  1/10/2018 11:41 AM











CHEST ONE VIEW PORTABLE





HISTORY:      Stroke symptoms. Back pain.





COMPARISON: Chest CTA 1/10/2018.





FINDINGS: Low lung volumes. No pleural effusions. No pneumothorax. The heart


remains top normal in size. No new focal lung consolidations. Mild diffuse


interstitial thickening.





IMPRESSION:





1. Mild diffuse interstitial thickening which could be chronic.


2. The heart remains top normal in size.








Electronically signed by:  Armando Henderson M.D.


1/10/2018 12:04 PM





Dictated Date/Time:  1/10/2018 12:03 PM





Laboratory Results











Test


  1/10/18


11:14 1/10/18


11:51 1/10/18


13:05


 


Bedside Prothrombin Time INR 1.1 (0.9-1.1)   


 


Bedside Hemoglobin


  


  13.6 g/dl


(12.0-16.0) 


 


 


Bedside Hematocrit  40 % (37-47)  


 


Hypersegmented Polys  1+  


 


Activated Partial


Thromboplast Time 


  29.8 SECONDS


(21.0-31.0) 


 


 


Partial Thromboplastin Ratio  1.1  


 


Bedside Sodium


  


  138 mEq/L


(135-144) 


 


 


Bedside Potassium


  


  4.5 mEq/L


(3.3-5.0) 


 


 


Bedside Chloride


  


  100 mEq/L


(101-112) 


 


 


Bedside Total CO2


  


  30 mEq/l


(24-31) 


 


 


Bedside Blood Urea Nitrogen


  


  15 mg/dl


(7-18) 


 


 


Bedside Creatinine


  


  0.8 mg/dl


(0.6-1.3) 


 


 


Bedside Glucose (other)


  


  142 mg/dl


(70-99) 


 


 


Estimated Average Glucose  126 mg/dl  


 


Hemoglobin A1c


  


  6.0 %


(4.5-5.6) 


 


 


Bedside Ionized Calcium (Castro)


  


  1.16 mmol/l


(1.12-1.32) 


 


 


Total Creatine Kinase


  


  32 U/L


() 


 


 


Creatine Kinase MB


  


  0.5 ng/ml


(0.5-3.6) 


 


 


Creatine Kinase MB Ratio  1.6 (0-3.0)  


 


Procalcitonin


  


  0.08 ng/ml


(0-0.5) 


 


 


Urine Color   YELLOW 


 


Urine Appearance   CLEAR (CLEAR) 


 


Urine pH   8.0 (4.5-7.5) 


 


Urine Specific Gravity


  


  


  1.038


(1.000-1.030)


 


Urine Protein   NEG (NEG) 


 


Urine Glucose (UA)   NEG (NEG) 


 


Urine Ketones   NEG (NEG) 


 


Urine Occult Blood   1+ (NEG) 


 


Urine Nitrite   NEG (NEG) 


 


Urine Bilirubin   NEG (NEG) 


 


Urine Urobilinogen   NEG (NEG) 


 


Urine Leukocyte Esterase   NEG (NEG) 


 


Urine WBC (Auto)   0 /hpf (0-5) 


 


Urine RBC (Auto)


  


  


  10-30 /hpf


(0-4)


 


Urine Hyaline Casts (Auto)   0 /lpf (0-5) 


 


Urine Epithelial Cells (Auto)


  


  


  20-30 /lpf


(0-5)


 


Urine Bacteria (Auto)   NEG (NEG) 














 Date/Time


Source Procedure


Growth Status


 


 


 1/10/18 00:00


Nasal MRSA DNA Surveillance Screen - Final


Specimen Negative for MRSA by DNA Probe Complete





Laboratory results per my review.





Medications Administered











 Medications


  (Trade)  Dose


 Ordered  Sig/Mikey


 Route  Start Time


 Stop Time Status Last Admin


Dose Admin


 


 Sodium Chloride  1,000 ml @ 


 50 mls/hr  Q20H


 IV  1/10/18 11:11


 1/10/18 17:32 DC 1/10/18 11:51


50 MLS/HR


 


 Alteplase,


 Recombinant 66.6


 mg/Empty Bag  66.6 ml @ 


 66.6 mls/hr  TODAY@1230  ONCE


 IV  1/10/18 12:30


 1/10/18 13:29 DC 1/10/18 12:39


66.6 MLS/HR


 


 Alteplase,


 Recombinant 7.4


 mg/Syringe  7.4 ml @ 


 7.4 mls/min  TODAY@1230


 IV  1/10/18 12:30


 1/10/18 14:00 DC 1/10/18 12:40


7.4 MLS/MIN


 


 Ondansetron HCl


  (Zofran Inj)  4 mg  NOW  STAT


 IV  1/10/18 13:15


 1/10/18 13:16 DC 1/10/18 13:28


4 MG


 


 Morphine Sulfate


  (MoRPHine


 SULFATE INJ)  4 mg  Q15M  PRN


 IV  1/10/18 13:15


 1/10/18 17:32 DC 1/10/18 13:28


4 MG


 


 Acetaminophen


  (Tylenol Tab)  650 mg  Q4H  PRN


 PO  1/10/18 15:00


 2/9/18 14:59  1/11/18 05:59


650 MG


 


 Morphine Sulfate


  (MoRPHine


 SULFATE INJ)  2 mg  Q6H  PRN


 IV  1/10/18 15:00


 1/24/18 14:59  1/11/18 13:57


2 MG











ECG


Indication:  weakness


Rate (beats per minute):  83


Rhythm:  normal sinus


Findings:  ST depression (Inferior), no ectopy


Comparison ECG Date:  changes are new compared to April 28 2016





ED Course


1107: The patient was evaluated in room A2. A complete history and physical 

examination were performed. 





1111: Ordered NSS 1000 ml @ 50 mls/hr IV.





1202: I discussed the patient with Dr. Dennis Aguirre vascular neurology.





1226: I discussed the patient with Dr. Dennis Aguirre vascular neurology - 

we are a go for TPA.





1227: I reevaluated the patient and updated her and her family.





1230: Ordered Alteplase Recombinant 7.4 mg/Syringe 7.4 ml @ 7.4 mls/min IV.





1306: Upon reevaluation, the patient is resting. I discussed results and 

treatment plan with her. She verbalizes agreement and understanding. The 

patient will be evaluated for further management and care. 





1320: I discussed the patient with Moira Hernandez. She will 

evaluate the patient for further treatment.





1338: I discussed the patient with Dr. Simran Hernandez cardiology.





Medical Decision





Differential diagnosis:


Etiologies such as metabolic, infection, hypo/hyperglycemia, electrolyte 

abnormalities, cardiac sources, intracerebral event, toxicologic, neurologic, 

as well as others were entertained. 





Nursing notes reviewed.





The patient is a 77-year-old female who presented to the emergency Department 

initially for back pain. On my questioning the patient admitted that she also 

had problems using her left arm since approximately 9 a.m. this morning. The 

patient was made a stroke alert immediately after this was discovered. The 

patient also stated that she had very significant back pain which she describes 

as thoracic back pain. It was not reproducible on physical exam. Given the 

patient's overall presentation I was concerned this could represent a 

dissection so radiographic studies were obtained including CT of the head neck 

and chest with contrast and angiography. There is no definite abnormality noted 

which could be consistent with dissection or stroke. For this reason I 

discussed her case with the stroke neurologist at . 

Because no other obvious contraindication could be found the patient was given 

TPA after discussing with the patient as well as her family via the stroke 

neurologist they consented to this care. The patient was reevaluated multiple 

times. She started having other symptoms and had serial EKGs. Her EKG did start 

to show some abnormalities which could be consistent with ischemia including ST 

segment depression and peaked hyperacute T waves. Certainly this could 

represent cerebral changes on the EKG but because of the changes I discussed 

her case with the on-call [facility] cardiologist. I also discussed her case 

with the on-call [facility] hospitalist group. They have agreed to evaluate the 

patient in the emergency department for further management and disposition. The 

patient was reevaluated multiple times. She was found have an elevated white 

blood cell count although I'm unsure the cause of this at this time.





Medication Reconcilliation


Current Medication List:  was personally reviewed by me





Blood Pressure Screening


Patient's blood pressure:  Elevated blood pressure


Blood pressure disposition:  Elevated BP felt to be situational





Consults


Time Called:  1153


Consulting Physician:  Dr. Dennis Aguirre vascular neurology


Returned Call:  1202


I discussed the patient with Dr. Dennis Aguirre vascular neurology.


Additional Consults:  


   Time Called:  1224


   Consulted Physician:  Dr. Dennis Aguirre vascular neurology


   Returned Call:  1226


Additional Comments:


I discussed the patient with Dr. Dennis Aguirre vascular neurology - we are 

a go for TPA.


   Time Called:  1315


   Consulted Physician:  Moira Hernandez


   Returned Call:  1320


Additional Comments:


I discussed the patient with Moira Hernandez. She will evaluate 

the patient for further treatment.





Impression





 Primary Impression:  


 LUE weakness


 Additional Impressions:  


 CVA (cerebral vascular accident)


 Back pain


 EKG, abnormal





Critical Care


I have personally spent greater than 40 minutes of critical care time in the 

direct management of this patient.  This includes bedside care, interpretation 

of diagnostic studies, and testing, discussion with consultants, patient, and 

family members, and other required patient management activities.  This 40 

minutes is in excess of all separately billable procedures.





Scribe Attestation


The scribe's documentation has been prepared under my direction and personally 

reviewed by me in its entirety. I confirm that the note above accurately 

reflects all work, treatment, procedures, and medical decision making performed 

by me.





Departure Information


Dispostion


Being Evaluated By Hospitalist





Referrals


ANGELIQUE Burgess M.D. (MEDICAL) (PCP)





Patient Instructions


My [facility]





Stroke History


Time Last Known Well


0900 today





Stroke t-PA Criteria Reviewed


Meets criteria for t-PA





Reason t-PA Not Given


Treatment provided - N/A





Problem Qualifiers








 Additional Impressions:  


 CVA (cerebral vascular accident)


 CVA mechanism:  unspecified  Qualified Codes:  I63.9 - Cerebral infarction, 

unspecified


 Back pain


 Back pain location:  thoracic back pain  Chronicity:  acute  Back pain 

laterality:  midline  Qualified Codes:  M54.6 - Pain in thoracic spine

## 2018-01-10 NOTE — DIAGNOSTIC IMAGING REPORT
CHEST ONE VIEW PORTABLE



HISTORY:      Stroke symptoms. Back pain.



COMPARISON: Chest CTA 1/10/2018.



FINDINGS: Low lung volumes. No pleural effusions. No pneumothorax. The heart

remains top normal in size. No new focal lung consolidations. Mild diffuse

interstitial thickening.



IMPRESSION:



1. Mild diffuse interstitial thickening which could be chronic.

2. The heart remains top normal in size.







Electronically signed by:  Armando Henderson M.D.

1/10/2018 12:04 PM



Dictated Date/Time:  1/10/2018 12:03 PM

## 2018-01-10 NOTE — DIAGNOSTIC IMAGING REPORT
CT ANGIOGRAM OF THE BRAIN 



CLINICAL HISTORY: Left arm weakness.



COMPARISON STUDY:  CT of the brain dated 1/10/2018.



TECHNIQUE: Following the IV administration of 94 cc of Optiray 320, CT angiogram

of the brain was performed from the skull base to the vertex. Images are

reviewed in the axial, sagittal, and coronal planes. 3-D MIPS images are created

and assessed. IV contrast was administered without complication.  A dose

lowering technique was utilized adhering to the principles of ALARA.



FINDINGS:



Brain parenchyma: There are age-related involutional changes noting mild

subcortical and periventricular microangiopathic disease. There is no

hemorrhage, mass effect, or evidence of acute territorial ischemia by CT

criteria. There is no evidence of enhancing mass lesion on the angiogram phase

images. No extra-axial fluid collection is seen. Gray-white matter

differentiation is preserved.



Ventricles, sulci, and cisterns: Prominent secondary to involutional change. 



CT angiogram of the brain: There is atherosclerotic calcification of the

cavernous carotid arteries. There is a right posterior communicating artery. The

internal carotid arteries are widely patent, as are the anterior and middle

cerebral arteries. The vertebrobasilar system and posterior cerebral arteries

are widely patent. The vertebral arteries are codominant. There is no aneurysm,

high-grade stenosis, or focal vessel cutoff identified throughout the

intracranial circulation.



Dural sinuses: Clear as visualized.



Orbits: The bony orbits are intact. The orbital contents are normal as

visualized noting bilateral ocular lens implants.



Sinuses and mastoids: Trace mucosal thickening is seen in the left maxillary

antrum. The remaining paranasal sinuses are clear. The mastoid air cells are

well pneumatized.



Calvarium: The skeletal structures are osteopenic. The calvarium appears intact.



Soft tissues: A small lipoma is noted in the right frontal scalp on image #131.





IMPRESSION: 



1. There is no hemorrhage, mass effect, or evidence of acute territorial

ischemia by CT criteria.



2. Unremarkable CT angiogram of the brain.







Electronically signed by:  Alvaro Carlson M.D.

1/10/2018 11:52 AM



Dictated Date/Time:  1/10/2018 11:46 AM

## 2018-01-10 NOTE — CRITICAL CARE CONSULTATION
Critical Care Consultation


Date of Consultation:


Lopez 10, 2018.


Attending Physician:


Kevan Cortes M.D.


Reason for Consultation:


77-year-old female with acute RIGHT-sided hemispheric CVA with LEFT upper 

extremity deficit receiving alteplase in the ER.  Admitted for close hemodynamic

/neurologic monitoring.


History of Present Illness


Patient is a 77-year-old female with a significant past medical history of 

hypertension, hyperlipidemia, and peripheral arterial disease who experienced 

LEFT upper extremity weakness early this morning at approximately 9:30 AM.  She 

was subtotally brought to the emergency department and underwent evaluation for 

CVA symptoms.  She received TPA for stroke like symptoms.  She was found to 

have a moderate leukocytosis of greater than 20,000.  She was not anemic.  She 

was not hypercoagulable.  No significant electrolyte derangement.  Initial 

cardiac enzymes were negative.  EKG was unremarkable.  Patient did undergo CTA 

of the chest for concerns of possible dissection with complaints of upper back 

discomfort.





Upon arrival to the ICU, the patient had just underwent MRI of the brain.  

Patient reports that she felt well upon awakening this morning, however reports 

that at approximately 9:30 AM, she developed an acute onset of LEFT-sided upper 

extremity weakness.  Eventually, the patient presented to the ER for further 

evaluation.  In addition to this episode, she was complaining of upper back 

pain.  She describes this pain as significant and reports that it waxes and 

wanes approximately 2-3 times per week.  Her pain was adequately controlled the 

emergency department with morphine and she reports minimal discomfort at this 

time.  The patient complains of a mild headache at this point.  She denies any 

blurry vision, double vision, slurred speech, neck pain/stiffness, nausea, 

vomiting, chest pain, palpitations, shortness of breath, pleuritic pain, or 

extremity pain.





Patient is a daily smoker.  She denies any significant alcohol or other 

substance use.





Past Medical/Surgical History


Medical Problems:


(1) Dyslipidemia


(2) Gastro-esophageal reflux


(3) HTN (hypertension)


(4) Hypertension


(5) PAD (peripheral artery disease)


(6) Peripheral vascular disease


(7) SOB (shortness of breath)


Surgical Problems:


(1) H/O bilateral oophorectomy


(2) H/O left breast biopsy


(3) H/O oophorectomy


(4) History of appendectomy


(5) History of cataract surgery


(6) History of colonoscopy


(7) Hx of appendectomy


(8) S/P femoral-popliteal bypass surgery





Family History





Asthma


  BROTHER


FH: CAD (coronary artery disease)


  FATHER


FH: CVA (cerebrovascular accident)


FH: arthritis


  FATHER


  MOTHER


  BROTHER


  SISTER


FH: cancer


  SISTER (esophageal)


FH: diabetes mellitus


  BROTHER


FH: esophageal cancer


Stroke


  FATHER


  MOTHER


Significant FHx of CVA





Social History


Smoking Status:  Current Every Day Smoker


Smokeless Tobacco Use:  No


Alcohol Use:  none


Drug Use:  none


Marital Status:  


Housing Status:  lives with family


Occupation Status:  retired





Allergies


Coded Allergies:  


     Adhesives (Verified  Allergy, Unknown, ., 1/10/18)


     Benzoin (Verified  Allergy, Unknown, ., 1/10/18)


     Hydrogen Peroxide (Verified  Allergy, Unknown, ., 1/10/18)


     Ceftriaxone (Unverified  Adverse Reaction, Unknown, burning at site of 

injection, 1/10/18)





Home Medications


Scheduled


Amlodipine (Norvasc), 5 MG PO DAILY


Aspirin (Aspirin Ec), 81 MG PO DAILY


Folic Acid (Folvite), 1 MG PO DAILY


Gabapentin (Neurontin), 300-600 MG PO TID


Lisinopril (Zestril), 40 MG PO TID


Metoprolol Succinate (Metoprolol Succinate ER), 25 MG PO QAM


Multiple Vitamins W/ Minerals (Thera-M), 1 TAB PO DAILY


Omeprazole (Prilosec), 20 MG PO DAILY


Simvastatin (Zocor), 40 MG PO QPM





Scheduled PRN


Acetaminophen (Acetaminophen), 650 MG PO Q4H PRN for Mild Pain


Albuterol Hfa (Ventolin Hfa), 2-4 PUFFS INH Q6H PRN for SOB/Wheezing





Current Inpatient Medications





Current Inpatient Medications








 Medications


  (Trade)  Dose


 Ordered  Sig/Mikey


 Route  Start Time


 Stop Time Status Last Admin


Dose Admin


 


 Ioversol


  (Optiray 320)  111 ml  UD  PRN


 IV  1/10/18 11:30


 1/14/18 11:29   


 


 


 Atorvastatin


 Calcium


  (Lipitor Tab)  40 mg  QAM


 PO  1/11/18 09:00


 2/10/18 08:59   


 


 


 Miscellaneous


 Information


  (Pharmacist


 Discharge Med Rec


 Consult)  1 ea  UD  PRN


 N/A  1/10/18 15:00


 2/9/18 14:59   


 


 


 Acetaminophen


  (Tylenol Tab)  650 mg  Q4H  PRN


 PO  1/10/18 15:00


 2/9/18 14:59   


 


 


 Nitroglycerin


  (Nitrostat Tab)  0.4 mg  UD  PRN


 SL  1/10/18 15:00


 2/9/18 14:59   


 


 


 Morphine Sulfate


  (MoRPHine


 SULFATE INJ)  2 mg  Q6H  PRN


 IV  1/10/18 15:00


 1/24/18 14:59   


 


 


 Miscellaneous


 Information


  (Icu Protocol


 For Hyperglycemia)  1 ea  PRN  PRN


 N/A  1/10/18 15:00


 1/12/18 14:59   


 


 


 Albuterol/


 Ipratropium


  (Duoneb)  3 ml  Q4H  PRN


 INH  1/10/18 15:15


 2/9/18 15:14   


 


 


 Albuterol


  (Ventolin Hfa


 Inhaler)  2 puffs  Q6H  PRN


 INH  1/10/18 15:15


 2/9/18 15:14   


 


 


 Gabapentin


  (Neurontin Cap)  300 mg  TID


 PO  1/10/18 21:00


 2/9/18 20:59   


 


 


 Metoprolol


 Succinate


  (Toprol Xl Tab)  25 mg  QAM


 PO  1/11/18 09:00


 2/10/18 08:59   


 


 


 Pantoprazole


 Sodium


  (Protonix Tab)  40 mg  DAILY


 PO  1/11/18 09:00


 2/10/18 08:59   


 


 


 Ondansetron HCl


  (Zofran Inj)  4 mg  Q6H  PRN


 IV  1/10/18 15:30


 2/9/18 15:29   


 


 


 Sodium Chloride  1,000 ml @ 


 50 mls/hr  Q20H ONCE


 IV  1/10/18 15:30


 1/11/18 11:29   


 


 


 Gadobutrol


  (Gadavist)  8 mmol  UD  PRN


 IV  1/10/18 20:00


 1/14/18 19:59 UNV  


 











Review of Systems


A complete 10-point Review of Systems was discussed with the patient, with 

pertinent positives and negatives listed in the History of Present Illness. All 

remaining Review of Systems questions can be considered negative unless 

otherwise specified.





Physical Exam











  Date Time  Temp Pulse Resp B/P (MAP) Pulse Ox O2 Delivery O2 Flow Rate FiO2


 


1/10/18 19:30  92 18 162/96 (118)  Nasal Cannula 2.0 


 


1/10/18 19:00  83 18 153/91 (111) 95 Nasal Cannula 2.0 


 


1/10/18 18:30  94 16 153/94 (113) 98   


 


1/10/18 18:00  80 16 165/78 (107) 94 Nasal Cannula 2.0 


 


1/10/18 17:30  80 19 159/77 (104) 89   


 


1/10/18 17:05 36.5 85 20 154/79 (104) 89 Room Air  


 


1/10/18 16:30 37.0 81 18 164/73 99  2.0 


 


1/10/18 16:00  82 18 156/76 99 Room Air  


 


1/10/18 15:30  82 18 143/73 98 Room Air  


 


1/10/18 15:00  81 18 153/69 97 Room Air  


 


1/10/18 14:30  77 18 175/78 99 Nasal Cannula 3.0 


 


1/10/18 14:15  81 18 177/76 96 Nasal Cannula 3.0 


 


1/10/18 14:15     99 Nasal Cannula 3.0 


 


1/10/18 14:01  168      


 


1/10/18 14:01  72      


 


1/10/18 14:00  84 16 148/85 98 Nasal Cannula 3.0 


 


1/10/18 13:45  82 16 130/80 98 Nasal Cannula 3.0 


 


1/10/18 13:30  85 14 159/80 96 Nasal Cannula 3.0 


 


1/10/18 13:28     87 Room Air  


 


1/10/18 13:15  87 15 168/92 95 Room Air  


 


1/10/18 13:04  74      


 


1/10/18 13:00  75 17 176/103 96 Room Air  


 


1/10/18 12:45  77 17 167/79 94 Room Air  


 


1/10/18 12:31  77 14 165/78 94 Room Air  


 


1/10/18 12:22  84 30 166/82 94 Room Air  


 


1/10/18 12:05  83 16 145/79 93 Room Air  


 


1/10/18 12:00  80 22 159/109 92 Room Air  


 


1/10/18 11:43     93 Room Air  


 


1/10/18 11:35  86 18 148/100 93 Room Air  


 


1/10/18 10:52  85      


 


1/10/18 10:48 37.0 77 18 154/81 98 Room Air  








VITAL SIGNS - Vital signs and nursing notes were reviewed.


GENERAL - 77-year-old female appearing her stated age who is in no acute 

distress. Communicates well with provider and answers questions appropriately.


HEAD - Normocephalic, Atraumatic. No Shaw's Sign or Raccoon's Eyes. No 

depressed skull fractures palpable.


EYES - PERRL with EOMI bilaterally. Sclera anicteric. Palpebral conjunctiva 

pink and moist with no injection noted.


EARS - No deformities of external structures noted on gross examination 

bilaterally. No pain elicited with palpation of the tragus bilaterally. 

External auditory canals without discharge or otorrhea. Tympanic membranes 

pearly gray without retraction or bulging.


NOSE - Midline and without cyanosis. No epistaxis or purulent drainage noted. 

Septum midline without deviation or septal hematoma noted.


MOUTH/OROPHARYNX - Without perioral cyanosis. Buccal mucosa pink and moist and 

without leukoplakia. Tongue midline with equal elevation of palate bilaterally.


NECK - Neck with FROM. Supple to palpation. No lymphadenopathy noted. No nuchal 

rigidity.


LUNGS - Chest wall symmetric without accessory muscle use, intercostals 

retractions, or central cyanosis. Normal vesicular breath sounds CTA B/L. No 

wheezes, rales, or rhonchi appreciated.


CARDIAC - RRR with S1/S2. No murmur, rubs, or gallops appreciated.


ABDOMEN - Abdominal contour flat and without pulsations or visible masses. BS 

normoactive all four quadrants. No tenderness, palpable masses, 

hepatosplenomegaly, or ascites noted.


EXTREMITIES - No pretibial edema present. +3/5 radial and dorsalis pedis pulses 

palpated throughout. +3/5 strength appreciated LEFT versus RIGHT upper 

extremities. +4/5 strength appreciated bilaterally of the lower extremities.


NEUROLOGIC - Cranial nerves II through XII grossly intact. Sensory intact to 

light touch throughout. LEFT upper deficit as described above. Positive LEFT 

Sided Drift.


PSYCH - A&Ox3 and cooperates fully with examiner. Pt is very pleasant and 

interacts well with examiner.





Laboratory Results





Last 24 Hours








Test


  1/10/18


11:14 1/10/18


11:51 1/10/18


13:05 1/10/18


15:43


 


Bedside Prothrombin Time INR 1.1    


 


White Blood Count  20.39 K/uL   


 


Red Blood Count  4.43 M/uL   


 


Hemoglobin  13.9 g/dL   


 


Hematocrit  41.6 %   


 


Mean Corpuscular Volume  93.9 fL   


 


Mean Corpuscular Hemoglobin  31.4 pg   


 


Mean Corpuscular Hemoglobin


Concent 


  33.4 g/dl 


  


  


 


 


Platelet Count  279 K/uL   


 


Mean Platelet Volume  11.4 fL   


 


Neutrophils (%) (Auto)  73.9 %   


 


Lymphocytes (%) (Auto)  6.3 %   


 


Monocytes (%) (Auto)  19.0 %   


 


Eosinophils (%) (Auto)  0.2 %   


 


Basophils (%) (Auto)  0.2 %   


 


Neutrophils # (Auto)  15.06 K/uL   


 


Lymphocytes # (Auto)  1.28 K/uL   


 


Monocytes # (Auto)  3.87 K/uL   


 


Eosinophils # (Auto)  0.05 K/uL   


 


Basophils # (Auto)  0.04 K/uL   


 


RDW Standard Deviation  49.8 fL   


 


RDW Coefficient of Variation  14.5 %   


 


Immature Granulocyte % (Auto)  0.4 %   


 


Immature Granulocyte # (Auto)  0.09 K/uL   


 


Hypersegmented Polys  1+   


 


Prothrombin Time  10.7 SECONDS   


 


Prothromb Time International


Ratio 


  1.0 


  


  


 


 


Activated Partial


Thromboplast Time 


  29.8 SECONDS 


  


  


 


 


Partial Thromboplastin Ratio  1.1   


 


Sodium Level  137 mmol/L   


 


Potassium Level  4.4 mmol/L   


 


Chloride Level  103 mmol/L   


 


Carbon Dioxide Level  29 mmol/L   


 


Anion Gap  5.0 mmol/L   


 


Blood Urea Nitrogen  16 mg/dl   


 


Creatinine  0.85 mg/dl   


 


Est Creatinine Clear Calc


Drug Dose 


  56.5 ml/min 


  


  


 


 


Estimated GFR (


American) 


  77.7 


  


  


 


 


Estimated GFR (Non-


American 


  67.0 


  


  


 


 


BUN/Creatinine Ratio  18.9   


 


Random Glucose  142 mg/dl   


 


Calcium Level  8.9 mg/dl   


 


Magnesium Level  2.1 mg/dl   


 


Total Creatine Kinase  32 U/L   


 


Creatine Kinase MB  0.5 ng/ml   


 


Creatine Kinase MB Ratio  1.6   


 


Troponin I  0.031 ng/ml   


 


Procalcitonin  0.08 ng/ml   


 


Urine Color   YELLOW  


 


Urine Appearance   CLEAR  


 


Urine pH   8.0  


 


Urine Specific Gravity   1.038  


 


Urine Protein   NEG  


 


Urine Glucose (UA)   NEG  


 


Urine Ketones   NEG  


 


Urine Occult Blood   1+  


 


Urine Nitrite   NEG  


 


Urine Bilirubin   NEG  


 


Urine Urobilinogen   NEG  


 


Urine Leukocyte Esterase   NEG  


 


Urine WBC (Auto)   0 /hpf  


 


Urine RBC (Auto)   10-30 /hpf  


 


Urine Hyaline Casts (Auto)   0 /lpf  


 


Urine Epithelial Cells (Auto)   20-30 /lpf  


 


Urine Bacteria (Auto)   NEG  


 


Lactic Acid Level    2.4 mmol/L 


 


Test


  1/10/18


18:06 1/10/18


18:30 1/10/18


20:00 


 


 


Bedside Glucose 96 mg/dl    











Diagnostic Results


Radiological imaging and reports were reviewed by myself.





Radiologist's Interpretation as follows:











HEAD WITHOUT CONTRAST (CT)





CT DOSE: 638.56 mGycm





HISTORY: Mental status change  Stroke





TECHNIQUE: Multiaxial CT images of the head were performed without the use of


intravenous contrast.  A dose lowering technique was utilized adhering to the


principles of ALARA.








Comparison: None.





Findings: The paranasal sinuses and mastoid air cells are clear. The calvarium


and skull base are intact. The ventricles and sulci are within normal limits.


There is no mass, hematoma, midline shift, or acute infarct.





Impression:


No acute intracranial abnormality. Mild age-related atrophy and chronic small


vessel change














CHEST ONE VIEW PORTABLE





HISTORY:      Stroke symptoms. Back pain.





COMPARISON: Chest CTA 1/10/2018.





FINDINGS: Low lung volumes. No pleural effusions. No pneumothorax. The heart


remains top normal in size. No new focal lung consolidations. Mild diffuse


interstitial thickening.





IMPRESSION:





1. Mild diffuse interstitial thickening which could be chronic.


2. The heart remains top normal in size.














Study: CT angiography of the chest





HISTORY: Pain. Mental status change.





Findings. Generalized atherosclerotic change and ectasia thoracic aorta. No


evidence for true aneurysm or dissection.





Several small mediastinal and/or hilar nodes. Mild interstitial prominence


throughout both hemithoraces suggesting nonspecific interstitial pneumonitis. No


consolidative infiltrative changes. Minimal dependent basilar atelectatic


change. Moderate degenerative disc change of the thoracic region. No evidence


for compression deformity.





IMPRESSION: No evidence for aneurysm or dissection.


2. Moderate atherosclerotic change thoracic aorta.


3. Slight generalized interstitial prominence throughout both hemithoraces


raising the possibility of a mild diffuse interstitial pneumonitis.














CT ANGIOGRAM OF THE BRAIN 





CLINICAL HISTORY: Left arm weakness.





COMPARISON STUDY:  CT of the brain dated 1/10/2018.





TECHNIQUE: Following the IV administration of 94 cc of Optiray 320, CT angiogram


of the brain was performed from the skull base to the vertex. Images are


reviewed in the axial, sagittal, and coronal planes. 3-D MIPS images are created


and assessed. IV contrast was administered without complication.  A dose


lowering technique was utilized adhering to the principles of ALARA.





FINDINGS:





Brain parenchyma: There are age-related involutional changes noting mild


subcortical and periventricular microangiopathic disease. There is no


hemorrhage, mass effect, or evidence of acute territorial ischemia by CT


criteria. There is no evidence of enhancing mass lesion on the angiogram phase


images. No extra-axial fluid collection is seen. Gray-white matter


differentiation is preserved.





Ventricles, sulci, and cisterns: Prominent secondary to involutional change. 





CT angiogram of the brain: There is atherosclerotic calcification of the


cavernous carotid arteries. There is a right posterior communicating artery. The


internal carotid arteries are widely patent, as are the anterior and middle


cerebral arteries. The vertebrobasilar system and posterior cerebral arteries


are widely patent. The vertebral arteries are codominant. There is no aneurysm,


high-grade stenosis, or focal vessel cutoff identified throughout the


intracranial circulation.





Dural sinuses: Clear as visualized.





Orbits: The bony orbits are intact. The orbital contents are normal as


visualized noting bilateral ocular lens implants.





Sinuses and mastoids: Trace mucosal thickening is seen in the left maxillary


antrum. The remaining paranasal sinuses are clear. The mastoid air cells are


well pneumatized.





Calvarium: The skeletal structures are osteopenic. The calvarium appears intact.





Soft tissues: A small lipoma is noted in the right frontal scalp on image #131.








IMPRESSION: 





1. There is no hemorrhage, mass effect, or evidence of acute territorial


ischemia by CT criteria.





2. Unremarkable CT angiogram of the brain.














NECK CTA





HISTORY: Left upper extremity weakness. Assess for stroke.     





TECHNIQUE: Multiaxial CT images of the neck were performed following the


intravenous administration of contrast to evaluate the major cervical vessels.


Maximum intensity projection images were also obtained. All measurements were


calculated based on NASCET criteria.  A dose lowering technique was utilized


adhering to the principles of ALARA.





COMPARISON STUDY:  Head CT 1/10/2018. Carotid Doppler 8/6/2013.





FINDINGS: The aortic arch is normal in caliber. Focal mild narrowing of


approximately 30% at the takeoff of the left subclavian artery. Otherwise, the


remaining great vessels are patent. Mild calcified plaque seen within the


bilateral carotid bifurcations without significant stenosis. Tortuous bilateral


mid internal carotid arteries. Of note, the majority of the external carotid


artery branches originate from the mid right internal carotid artery consistent


with a high bifurcation. There is mild calcified plaque at this location. The


bilateral common carotid arteries are widely patent. No significant stenosis


within the bilateral vertebral arteries.





IMPRESSION:  


No significant stenosis, occlusion, or dissection identified within the carotid


or vertebral arteries. Incidental is made of a high right carotid bifurcation.














ULTRASOUND OF THE CAROTID ARTERIES





CLINICAL HISTORY: Stroke    





COMPARISON STUDY: 8/6/2013, MR angiography neck dated 1/10/2018





TECHNIQUE: Real-time, grayscale, and color Doppler sonography of the carotid


arteries was performed. Imaging reviewed in the transverse and longitudinal


planes. NASCET criteria was utilized for stenosis calcification.





FINDINGS:





There is a high bifurcation. The distal internal carotid arteries were difficult


to delineate. Right external carotid artery was nonvisualized.





There is mild atherosclerotic plaque present    . 


The peak systolic velocity within the right internal carotid artery is 151


cm/sec.


The systolic velocity ratio of right internal to common carotid artery is 2.4.


The peak systolic velocity within the left internal carotid artery is 106


cm/sec.


The systolic velocity ratio left internal to common carotid artery is 1.5.





Antegrade flow is seen in the vertebral arteries. The external carotid arteries


are patent.





Blood pressure in the right arm measured 169 mm/Hg.  Blood pressure in the left


arm measured 179 mm/Hg.





There are mildly elevated velocities of the right internal carotid artery. The


waveforms however appear otherwise unremarkable and on grayscale, no stenosis is


visualized. No turbulence is visualized on color flow. There is no corresponding


stenosis on the CT angiogram performed the same day. A hemodynamically


significant stenosis is therefore not felt to be present.








IMPRESSION: No evidence of unilaterally significant stenosis. Please see above


discussion.














MRI OF THE BRAIN WITHOUT AND WITH IV CONTRAST





CLINICAL HISTORY: Stroke.    





COMPARISON STUDY:  Head CT and CTA of the head January 10, 2018 and MRI of the


brain August 7, 2013. 





TECHNIQUE:  Utilizing a 1.5 Kanika magnet and dedicated coil, multiplanar,


multiecho imaging of the brain was performed pre and postcontrast


administration.  IV administration of 8 mL of Gadavist contrast was uneventful.





FINDINGS: There are are multiple small foci of restricted diffusion within the


right MCA territory, involving the right frontal and parietal lobes. The largest


focus is a 2.1 cm infarct within the right parietal lobe which involves the


postcentral gyrus. There is mild corresponding signal abnormality. There is no


significant mass effect with there is no evidence for hemorrhagic conversion.


Ventricular system is unremarkable for age. Basilar cisterns are patent. There


are no extra-axial collections. Flow-voids for the major intracranial vessels


are present. Postcontrast images are compromised by motion artifact but no mass


or pathologic enhancement is evident. Calvarial signal is unremarkable. Orbits


are unremarkable.





IMPRESSION:  Multiple small foci of acute infarction within the right frontal


and parietal lobes, measuring up to 2.1 cm. No significant mass effect. No


evidence for hemorrhagic conversion.











Assessment & Plan





(1) CVA (cerebral vascular accident)


(2) Back pain


(3) LUE weakness


(4) Peripheral vascular disease


(5) Hypertension


(6) Gastro-esophageal reflux


(7) HTN (hypertension)


(8) Dyslipidemia


(9) PAD (peripheral artery disease)


Reason Critically Ill: 77-year-old female with acute RIGHT-sided hemispheric 

CVA with LEFT upper extremity deficit receiving alteplase in the ER.  Admitted 

for close hemodynamic/neurologic monitoring.





Neuro -


* CAM ICU: NEGATIVE


* Acute RIGHT Sided Frontal/Hemispheric CVA w/ LEFT Upper Extremity Weakness:


 * [place] [place]


 * Monitoring per [place]PA protocols.


 * Serial Neuro checks.


 * MRI - multiple small infarcts to the frontal/hemispheric lobes.


 * Permissive HTN.


 * Defer antiplatelet/anticoagulation recommendations to Neurology for tomorrow 

(1/11), however with known h/o PAD/HDL, ? benefit from DAPT Tx?


 * Will add Day 2 TPA orders per protocol.


* Chronic Back Pain:


 * Negative Dissection Series in ED.


 * Plain films tomorrow.


 * Morphine PRN.





Cardiac -


* h/o HTN, HLD, PAD:


 * Continue home Rx.


 * EKG - NSR 83 bpm QTc 448ms


 * Initial Troponin Negative - **Repeat Troponin 10, will trend**. No chest 

pain at this time. Echo already performed in ED. Heparin contraindicated at 

this point 2/2 recent TPA administration.


 * Will formally consult cardiology for further guidance.


 * ECHO:


 * *  -- Conclusions --


 * Left ventricular systolic function is normal.


 * Ejection Fraction = 65-70%.


 * There is mild concentric left ventricular hypertrophy.


 * The left atrium is moderately dilated.


 * There is severe mitral annular calcification.


 * There is mild mitral regurgitation.


 * Aortic valve sclerosis moderate, without significant aortic valvular 

stenosis.


 * Grade I diastolic dysfunction, (abnormal relaxation pattern).





Respiratory -


* Daily smoker:


 * PRN inhaler treatments


 * NC PRN


 * Smoking cessation.





GI - 


* h/o GERD:


 * Protonix


* Clear liquids at this point.


* Formal swallow evaluation tomorrow.


 * Progress diet from this point.


   


RENAL/LYTES -


* Monitor Electrolytes - replace appropriately.


* IVF: NSS@50mL/hr





 - 


* I&Os





ENDO - 


* No h/o DM or Thyroid Dz


 * BSGs - ISS/gtt per protocol.





HEME -


* Stable H&H - monitor for any changes w/ recent TPA.





ID -


* Leukocytosis - appears to be chronically elevated.


 * ??Intermittent Steroid use 2/2 lung Dz.


* No immediate concerns for acute infection.


* Will trend fever curve.





LINES/IV ACCESS - 


* PIVs intact.





DVT PROPHYLAXIS -


* No chemoprophylaxis at this point 2/2 TPA - will await Neuro recommendations 

for further Rx.


* SCDs.





I have personally spent 45 minutes of critical care time in the direct 

management of this patient. This is a life/limb threatening event. This 

includes time spent evaluating patient, direct bedside care, chart review, 

placing orders, interpretation of diagnostic studies, discussion with 

consultants, patient, and family members, as well as other required patient 

management activities. 


This time is exclusive of all separately billable procedures, and teaching time 

and separate from and in addition to any other critical care service time.





Thank you for this consultation allow us to be part of this patient's care.  

Please refer to my attending physician's documentation for any further 

recommendations.





Physician Supervision Note:





I discussed the case with Param Plaza PA-C and agree with the findings and plan 

as documented in the note. I performed an assessment of the patient. Any 

exceptions or clarifications are listed here: 





Patient s/p tPA for acute CVA, with significant improvement in the neurological 

deficits (left sided weakness). Also fount to have NSTEMI by labs. Denies chest 

pain, but has been complaining of back pain. She is awake, alert





F/u repeat CT brain


Start ASA and DVT prophylaxis


BP control, keep under 180/100


Trend troponin.


Echo report reviewed. 


Cardiology evaluation


Physical therapy





Documented By:  Aman Chris MD

## 2018-01-11 VITALS
OXYGEN SATURATION: 96 % | HEART RATE: 67 BPM | TEMPERATURE: 97.52 F | DIASTOLIC BLOOD PRESSURE: 46 MMHG | SYSTOLIC BLOOD PRESSURE: 107 MMHG

## 2018-01-11 VITALS — DIASTOLIC BLOOD PRESSURE: 60 MMHG | HEART RATE: 74 BPM | OXYGEN SATURATION: 97 % | SYSTOLIC BLOOD PRESSURE: 137 MMHG

## 2018-01-11 VITALS — HEART RATE: 75 BPM | DIASTOLIC BLOOD PRESSURE: 70 MMHG | OXYGEN SATURATION: 98 % | SYSTOLIC BLOOD PRESSURE: 132 MMHG

## 2018-01-11 VITALS — SYSTOLIC BLOOD PRESSURE: 151 MMHG | DIASTOLIC BLOOD PRESSURE: 73 MMHG | HEART RATE: 72 BPM | OXYGEN SATURATION: 97 %

## 2018-01-11 VITALS — SYSTOLIC BLOOD PRESSURE: 146 MMHG | DIASTOLIC BLOOD PRESSURE: 64 MMHG | OXYGEN SATURATION: 96 % | HEART RATE: 70 BPM

## 2018-01-11 VITALS — HEART RATE: 67 BPM | SYSTOLIC BLOOD PRESSURE: 135 MMHG | DIASTOLIC BLOOD PRESSURE: 67 MMHG | OXYGEN SATURATION: 97 %

## 2018-01-11 VITALS — OXYGEN SATURATION: 99 % | SYSTOLIC BLOOD PRESSURE: 169 MMHG | HEART RATE: 65 BPM | DIASTOLIC BLOOD PRESSURE: 77 MMHG

## 2018-01-11 VITALS — DIASTOLIC BLOOD PRESSURE: 67 MMHG | HEART RATE: 69 BPM | OXYGEN SATURATION: 97 % | SYSTOLIC BLOOD PRESSURE: 135 MMHG

## 2018-01-11 VITALS — SYSTOLIC BLOOD PRESSURE: 140 MMHG | HEART RATE: 69 BPM | OXYGEN SATURATION: 98 % | DIASTOLIC BLOOD PRESSURE: 76 MMHG

## 2018-01-11 VITALS — DIASTOLIC BLOOD PRESSURE: 68 MMHG | OXYGEN SATURATION: 99 % | SYSTOLIC BLOOD PRESSURE: 128 MMHG | HEART RATE: 67 BPM

## 2018-01-11 VITALS
SYSTOLIC BLOOD PRESSURE: 152 MMHG | DIASTOLIC BLOOD PRESSURE: 73 MMHG | HEART RATE: 74 BPM | OXYGEN SATURATION: 95 % | TEMPERATURE: 98.6 F

## 2018-01-11 VITALS — HEART RATE: 78 BPM | DIASTOLIC BLOOD PRESSURE: 62 MMHG | SYSTOLIC BLOOD PRESSURE: 171 MMHG | OXYGEN SATURATION: 96 %

## 2018-01-11 VITALS — DIASTOLIC BLOOD PRESSURE: 77 MMHG | OXYGEN SATURATION: 98 % | HEART RATE: 67 BPM | SYSTOLIC BLOOD PRESSURE: 168 MMHG

## 2018-01-11 VITALS — DIASTOLIC BLOOD PRESSURE: 52 MMHG | SYSTOLIC BLOOD PRESSURE: 121 MMHG | OXYGEN SATURATION: 95 % | HEART RATE: 69 BPM

## 2018-01-11 VITALS — HEART RATE: 76 BPM | OXYGEN SATURATION: 97 % | SYSTOLIC BLOOD PRESSURE: 146 MMHG | DIASTOLIC BLOOD PRESSURE: 72 MMHG

## 2018-01-11 VITALS — DIASTOLIC BLOOD PRESSURE: 70 MMHG | SYSTOLIC BLOOD PRESSURE: 136 MMHG | OXYGEN SATURATION: 98 % | HEART RATE: 60 BPM

## 2018-01-11 VITALS — OXYGEN SATURATION: 98 % | DIASTOLIC BLOOD PRESSURE: 70 MMHG | SYSTOLIC BLOOD PRESSURE: 149 MMHG | HEART RATE: 72 BPM

## 2018-01-11 VITALS
OXYGEN SATURATION: 98 % | TEMPERATURE: 98.06 F | SYSTOLIC BLOOD PRESSURE: 149 MMHG | DIASTOLIC BLOOD PRESSURE: 69 MMHG | HEART RATE: 71 BPM

## 2018-01-11 VITALS — OXYGEN SATURATION: 97 % | SYSTOLIC BLOOD PRESSURE: 147 MMHG | DIASTOLIC BLOOD PRESSURE: 76 MMHG | HEART RATE: 62 BPM

## 2018-01-11 VITALS — HEART RATE: 71 BPM | SYSTOLIC BLOOD PRESSURE: 151 MMHG | DIASTOLIC BLOOD PRESSURE: 69 MMHG | OXYGEN SATURATION: 98 %

## 2018-01-11 VITALS — SYSTOLIC BLOOD PRESSURE: 138 MMHG | OXYGEN SATURATION: 98 % | HEART RATE: 69 BPM | DIASTOLIC BLOOD PRESSURE: 66 MMHG

## 2018-01-11 VITALS — HEART RATE: 64 BPM | SYSTOLIC BLOOD PRESSURE: 106 MMHG | OXYGEN SATURATION: 97 % | DIASTOLIC BLOOD PRESSURE: 51 MMHG

## 2018-01-11 VITALS
DIASTOLIC BLOOD PRESSURE: 69 MMHG | TEMPERATURE: 97.88 F | SYSTOLIC BLOOD PRESSURE: 138 MMHG | OXYGEN SATURATION: 98 % | HEART RATE: 64 BPM

## 2018-01-11 VITALS — TEMPERATURE: 98.06 F

## 2018-01-11 VITALS — SYSTOLIC BLOOD PRESSURE: 127 MMHG | OXYGEN SATURATION: 95 % | DIASTOLIC BLOOD PRESSURE: 59 MMHG | HEART RATE: 70 BPM

## 2018-01-11 VITALS — TEMPERATURE: 98.24 F

## 2018-01-11 VITALS — OXYGEN SATURATION: 97 %

## 2018-01-11 LAB
ALBUMIN SERPL-MCNC: 2.4 GM/DL (ref 3.4–5)
ALP SERPL-CCNC: 79 U/L (ref 45–117)
ALT SERPL-CCNC: 20 U/L (ref 12–78)
AST SERPL-CCNC: 34 U/L (ref 15–37)
BASOPHILS # BLD: 0.05 K/UL (ref 0–0.2)
BASOPHILS NFR BLD: 0.3 %
BUN SERPL-MCNC: 17 MG/DL (ref 7–18)
CA-I BLD-SCNC: 1.16 MMOL/L (ref 1.12–1.32)
CALCIUM SERPL-MCNC: 8.5 MG/DL (ref 8.5–10.1)
CO2 SERPL-SCNC: 29 MMOL/L (ref 21–32)
CREAT BLD-MCNC: 0.8 MG/DL (ref 0.6–1.3)
CREAT SERPL-MCNC: 0.7 MG/DL (ref 0.6–1.2)
EOS ABS #: 0.3 K/UL (ref 0–0.5)
EOSINOPHIL NFR BLD AUTO: 143 K/UL (ref 130–400)
GLUCOSE SERPL-MCNC: 119 MG/DL (ref 70–99)
HBA1C MFR BLD: 6 % (ref 4.5–5.6)
HCT VFR BLD CALC: 43.3 % (ref 37–47)
HGB BLD-MCNC: 14.5 G/DL (ref 12–16)
IG#: 0.05 K/UL (ref 0–0.02)
IMM GRANULOCYTES NFR BLD AUTO: 17.4 %
INR PPP: 1.1 (ref 0.9–1.1)
ISTAT POTASSIUM: 4.5 MEQ/L (ref 3.3–5)
KETONES UR QL STRIP: 83 MG/DL
LYMPHOCYTES # BLD: 3.09 K/UL (ref 1.2–3.4)
MCH RBC QN AUTO: 31.8 PG (ref 25–34)
MCHC RBC AUTO-ENTMCNC: 33.5 G/DL (ref 32–36)
MCV RBC AUTO: 95 FL (ref 80–100)
MONO ABS #: 2.33 K/UL (ref 0.11–0.59)
MONOCYTES NFR BLD: 13.1 %
NEUT ABS #: 11.9 K/UL (ref 1.4–6.5)
NEUTROPHILS # BLD AUTO: 1.7 %
NEUTROPHILS NFR BLD AUTO: 67.2 %
PH UR: 138 MG/DL (ref 0–200)
PHOSPHATE SERPL-MCNC: 3.8 MG/DL (ref 2.5–4.9)
PMV BLD AUTO: 12.9 FL (ref 7.4–10.4)
POTASSIUM SERPL-SCNC: 4 MMOL/L (ref 3.5–5.1)
PROT SERPL-MCNC: 6.2 GM/DL (ref 6.4–8.2)
RED CELL DISTRIBUTION WIDTH CV: 15.1 % (ref 11.5–14.5)
RED CELL DISTRIBUTION WIDTH SD: 50.3 FL (ref 36.4–46.3)
SODIUM BLD-SCNC: 138 MEQ/L (ref 135–144)
SODIUM SERPL-SCNC: 140 MMOL/L (ref 136–145)
WBC # BLD AUTO: 17.72 K/UL (ref 4.8–10.8)

## 2018-01-11 RX ADMIN — MORPHINE SULFATE PRN MG: 2 INJECTION, SOLUTION INTRAMUSCULAR; INTRAVENOUS at 21:35

## 2018-01-11 RX ADMIN — PANTOPRAZOLE SCH MG: 40 TABLET, DELAYED RELEASE ORAL at 09:12

## 2018-01-11 RX ADMIN — MORPHINE SULFATE PRN MG: 2 INJECTION, SOLUTION INTRAMUSCULAR; INTRAVENOUS at 01:11

## 2018-01-11 RX ADMIN — ATORVASTATIN CALCIUM SCH MG: 40 TABLET, FILM COATED ORAL at 09:12

## 2018-01-11 RX ADMIN — GABAPENTIN SCH MG: 300 CAPSULE ORAL at 13:57

## 2018-01-11 RX ADMIN — GABAPENTIN SCH MG: 300 CAPSULE ORAL at 20:28

## 2018-01-11 RX ADMIN — METOPROLOL SUCCINATE SCH MG: 25 TABLET, EXTENDED RELEASE ORAL at 08:51

## 2018-01-11 RX ADMIN — MORPHINE SULFATE PRN MG: 2 INJECTION, SOLUTION INTRAMUSCULAR; INTRAVENOUS at 13:57

## 2018-01-11 RX ADMIN — GABAPENTIN SCH MG: 300 CAPSULE ORAL at 09:12

## 2018-01-11 RX ADMIN — ACETAMINOPHEN PRN MG: 325 TABLET ORAL at 20:29

## 2018-01-11 RX ADMIN — ACETAMINOPHEN PRN MG: 325 TABLET ORAL at 05:59

## 2018-01-11 RX ADMIN — MORPHINE SULFATE PRN MG: 2 INJECTION, SOLUTION INTRAMUSCULAR; INTRAVENOUS at 06:31

## 2018-01-11 NOTE — DIAGNOSTIC IMAGING REPORT
LUMBAR SPINE 3 VIEWS



CLINICAL HISTORY: Chronic low back pain.



FINDINGS: AP, lateral, and coned-down views of the lumbar spine are compared to

study dated 4/28/2016. The skeletal structures are osteopenic. There is no

radiographic evidence of fracture or malalignment. Vertebral body height and

alignment are maintained throughout the lumbar spine. The transverse and spinous

processes appear intact. Facet arthropathy is seen in the lower lumbar region.

Moderate disc space narrowing is noted at L5-S1. Mild disc space narrowing is

seen at the remaining lumbar levels. Tiny anterior osteophytes are seen

throughout. The visualized bony pelvis appears intact. Sclerotic change is noted

in the sacroiliac joints. There is a nonobstructed abdominal bowel gas pattern

noting moderate colonic fecal retention. Advanced atherosclerotic calcification

is seen in the abdominal aorta.



IMPRESSION:



1. No acute bony abnormality is seen involving the lumbar spine.



2. Osteopenia and mild spondylotic change as above.







Dictated:  1/11/2018 2:37 PM

Transcribed:  1/11/2018 2:50 PM

Rober







Electronically signed by:  Alvaro Carlson M.D.

1/11/2018 2:52 PM



Dictated Date/Time:  1/11/2018 2:37 PM

## 2018-01-11 NOTE — NEUROLOGY CONSULTATION
Neurology Consultation


Date of Consultation:


Jan 11, 2018.


Attending Physician:


Kevan Cortes M.D.


Primary Care Physician:


ANGELIQUE Burgess M.D. (MEDICAL)


Reason for Consultation:


CVA s/p tpa


History of Present Illness


Source:  patient


Angelica  is a 77 yr female with PMH of PVD, HLP, HTN, GERD, CKD III. She has a 

history of back pain and LUE weakness which started today. She was trying bend 

over to  a tissue from the floor and developed sudden onset of severe 

back pain in the middle of her back and also noticed to have Left hand weakness 

at around 9.30am. She sates that she was not able to move her fingers and "I 

felt funny". Her back pain was not radiating but dull and achy. She states they 

were trying to roll her in bed and it was very painful.She was brought to the 

ED by ambulance. She had a CT head which showed no acute findings. Stroke alert 

was call and they consulted  [facility] . She then 

received  tPA while in ED. Later she did show some improvement of LUE weakness 

post tPA. Denies CP, SOB, dizziness, cough, fever, chills, fall, head trauma, 

LOC, headache, change in vision, slurred speech, bowel/bladder incontinence, 

abdominal pain, diarrhea, dysuria. She does take aspirin 81 mg daily and she 

was told in the past that she had a TIA. Strong family history of stroke mother 

and father, no personal history of afib.





Past Medical/Surgical History


Medical Problems:


(1) Back pain


Status: Acute  





(2) CVA (cerebral vascular accident)


Status: Acute  





(3) Dyslipidemia


Status: Chronic  





(4) EKG, abnormal


Status: Acute  





(5) Elevated white blood cell count


Status: Acute  





(6) Flank pain


Status: Acute  





(7) Gallstones


Status: Acute  





(8) Gastro-esophageal reflux


Status: Chronic  





(9) HTN (hypertension)


Status: Chronic  





(10) Hypertension


Status: Chronic  





(11) Leukocytosis


Status: Acute  





(12) LUE weakness


Status: Acute  





(13) PAD (peripheral artery disease)


Status: Chronic  





(14) Peripheral vascular disease


Status: Chronic  





(15) Pneumonia


Status: Acute  





(16) TIA (transient ischemic attack)


Status: Acute  











Social History


Smoking Status:  Current some day smoker


Smokeless Tobacco Use:  No


Alcohol Use:  none


Drug Use:  none


Marital Status:  


Housing Status:  lives with family


Occupation Status:  retired





Allergies


Coded Allergies:  


     Adhesives (Verified  Allergy, Unknown, ., 1/10/18)


     Benzoin (Verified  Allergy, Unknown, ., 1/10/18)


     Hydrogen Peroxide (Verified  Allergy, Unknown, ., 1/10/18)


     Ceftriaxone (Unverified  Adverse Reaction, Unknown, burning at site of 

injection, 1/10/18)





Current Inpatient Medications





Current Inpatient Medications








 Medications


  (Trade)  Dose


 Ordered  Sig/Mikey


 Route  Start Time


 Stop Time Status Last Admin


Dose Admin


 


 Ioversol


  (Optiray 320)  111 ml  UD  PRN


 IV  1/10/18 11:30


 1/14/18 11:29   


 


 


 Atorvastatin


 Calcium


  (Lipitor Tab)  40 mg  QAM


 PO  1/11/18 09:00


 2/10/18 08:59  1/11/18 09:12


40 MG


 


 Miscellaneous


 Information


  (Pharmacist


 Discharge Med Rec


 Consult)  1 ea  UD  PRN


 N/A  1/10/18 15:00


 2/9/18 14:59   


 


 


 Acetaminophen


  (Tylenol Tab)  650 mg  Q4H  PRN


 PO  1/10/18 15:00


 2/9/18 14:59  1/11/18 05:59


650 MG


 


 Nitroglycerin


  (Nitrostat Tab)  0.4 mg  UD  PRN


 SL  1/10/18 15:00


 2/9/18 14:59   


 


 


 Morphine Sulfate


  (MoRPHine


 SULFATE INJ)  2 mg  Q6H  PRN


 IV  1/10/18 15:00


 1/24/18 14:59  1/11/18 06:31


2 MG


 


 Miscellaneous


 Information


  ([facility] Protocol


 For Hyperglycemia)  1 ea  PRN  PRN


 N/A  1/10/18 15:00


 1/12/18 14:59   


 


 


 Albuterol/


 Ipratropium


  (Duoneb)  3 ml  Q4H  PRN


 INH  1/10/18 15:15


 2/9/18 15:14   


 


 


 Albuterol


  (Ventolin Hfa


 Inhaler)  2 puffs  Q6H  PRN


 INH  1/10/18 15:15


 2/9/18 15:14   


 


 


 Gabapentin


  (Neurontin Cap)  300 mg  TID


 PO  1/10/18 21:00


 2/9/18 20:59  1/11/18 09:12


300 MG


 


 Metoprolol


 Succinate


  (Toprol Xl Tab)  25 mg  QAM


 PO  1/11/18 09:00


 2/10/18 08:59   


 


 


 Pantoprazole


 Sodium


  (Protonix Tab)  40 mg  DAILY


 PO  1/11/18 09:00


 2/10/18 08:59  1/11/18 09:12


40 MG


 


 Ondansetron HCl


  (Zofran Inj)  4 mg  Q6H  PRN


 IV  1/10/18 15:30


 2/9/18 15:29   


 


 


 Gadobutrol


  (Gadavist)  8 mmol  UD  PRN


 IV  1/10/18 20:00


 1/14/18 19:59   


 


 


 Sodium Chloride  1,000 ml @ 


 50 mls/hr  Q20H ONCE


 IV  1/10/18 22:45


 1/11/18 18:44  1/10/18 22:44


50 MLS/HR











Physical Exam


Vital Signs (Past 24 Hrs):











  Date Time  Temp Pulse Resp B/P (MAP) Pulse Ox O2 Delivery O2 Flow Rate FiO2


 


1/11/18 13:00  62 14 147/76 (99) 97 Nasal Cannula 2.0 


 


1/11/18 12:00      Nasal Cannula 2.0 


 


1/11/18 12:00 36.6 64 15 138/69 (92) 98 Nasal Cannula 2.0 


 


1/11/18 11:00  67 14 128/68 (88) 99 Nasal Cannula 2.0 


 


1/11/18 10:00  60 20 136/70 (92) 98 Nasal Cannula 2.0 


 


1/11/18 09:00  64 13 106/51 (69) 97 Nasal Cannula 2.0 


 


1/11/18 08:00 36.4 67 15 107/46 (66) 96 Nasal Cannula 2.0 


 


1/11/18 08:00      Nasal Cannula  


 


1/11/18 08:00      Nasal Cannula 2.0 


 


1/11/18 07:00  67 15 135/67 (89) 97 Nasal Cannula 2.0 


 


1/11/18 06:30  70 21 146/64 (91) 96 Nasal Cannula 2.0 


 


1/11/18 05:30  70 18 127/59 (81) 95 Nasal Cannula 2.0 


 


1/11/18 04:30  69 13 121/52 (75) 95 Nasal Cannula 2.0 


 


1/11/18 04:00      Nasal Cannula 2.0 


 


1/11/18 04:00 36.8       


 


1/11/18 03:30  69 17 140/76 (97) 98 Nasal Cannula 2.0 


 


1/11/18 02:30  75 17 132/70 (90) 98 Nasal Cannula 2.0 


 


1/11/18 01:30  69 17 138/66 (90) 98 Nasal Cannula 2.0 


 


1/11/18 00:30  69 17 135/67 (89) 97 Nasal Cannula 2.0 


 


1/11/18 00:00      Nasal Cannula 2.0 


 


1/11/18 00:00 36.7       


 


1/10/18 23:30  68 16 126/72 (90) 95 Nasal Cannula 2.0 


 


1/10/18 22:30  82 16 135/64 (87) 94 Nasal Cannula 2.0 


 


1/10/18 21:30  81 16 146/80 (102) 97 Nasal Cannula 2.0 


 


1/10/18 20:30  74 14 131/64 (86) 97 Nasal Cannula 2.0 


 


1/10/18 20:00 36.9  16 163/78 (106) 98 Nasal Cannula 2.0 


 


1/10/18 20:00      Nasal Cannula 2.0 


 


1/10/18 19:30  92 18 162/96 (118)  Nasal Cannula 2.0 


 


1/10/18 19:00  83 18 153/91 (111) 95 Nasal Cannula 2.0 


 


1/10/18 18:30  94 16 153/94 (113) 98   


 


1/10/18 18:00  80 16 165/78 (107) 94 Nasal Cannula 2.0 


 


1/10/18 17:30  80 19 159/77 (104) 89   


 


1/10/18 17:05 36.5 85 20 154/79 (104) 89 Room Air  


 


1/10/18 16:30 37.0 81 18 164/73 99  2.0 


 


1/10/18 16:00  82 18 156/76 99 Room Air  


 


1/10/18 15:30  82 18 143/73 98 Room Air  


 


1/10/18 15:00  81 18 153/69 97 Room Air  


 


1/10/18 14:30  77 18 175/78 99 Nasal Cannula 3.0 


 


1/10/18 14:15  81 18 177/76 96 Nasal Cannula 3.0 


 


1/10/18 14:15     99 Nasal Cannula 3.0 


 


1/10/18 14:01  168      


 


1/10/18 14:01  72      


 


1/10/18 14:00  84 16 148/85 98 Nasal Cannula 3.0 








Physical Exam:


Constitutional:  appearance nourished, healthy, obese


Ears, Nose, Mouth and Throat: mucous membranes moist, no injection and skin 

normal, eyes normal


Cardiovascular: normal S-1 and S-2 and regular rate and rhythm


Respiratory: clear to auscultation (CTA) and no rales, rhonchi or wheeze


Musculoskeletal: no peripheral edema and good distal pulses


Skin: no stigmata of neurocutaneous disease noted and normal and intact


Eyes: extraocular muscles intact (EOMI) and pupils equal, round and reactive to 

light (PERRL)





NEUROLOGIC EXAMINATION: 


Mental status: 


Alert and interactive


Oriented 2017, month January, [facility], why she is here


Oriented to person


Speech fluent with no evidence of aphasia, voice volume low





Cranial Nerves


smile eye brow raise symmetric tongue midline





Sensory: 


intact to light touch and cool touch





Coordination: 


finger to nose with no bipass dysmetric on left





Gait/Stance:


lying in bed. PT trying to get her OOB to chair but she whimpers in pain "my 

lower back and right hip hurts"





Motor:


Negative for pronator drift of out stretched arms with eyes closed.





Strength:


right biceps triceps hand  deltoid 5/5 right flex restricted by pain, 

patellar flex ext 5/5 plantar flex ext 5/5. left biceps triceps deltoid 

intrinsics, 4/5 , hip flex 2/5, patellar flex 3/5, plantar flex ext 4/5





Laboratory Results


Past 24 Hours:


1/11/18 05:44








Red Blood Count 4.56, Mean Corpuscular Volume 95.0, Mean Corpuscular Hemoglobin 

31.8, Mean Corpuscular Hemoglobin Concent 33.5, Mean Platelet Volume 12.9, 

Neutrophils (%) (Auto) 67.2, Lymphocytes (%) (Auto) 17.4, Monocytes (%) (Auto) 

13.1, Eosinophils (%) (Auto) 1.7, Basophils (%) (Auto) 0.3, Neutrophils # (Auto

) 11.90, Lymphocytes # (Auto) 3.09, Monocytes # (Auto) 2.33, Eosinophils # (Auto

) 0.30, Basophils # (Auto) 0.05














Test


  1/10/18


20:18 1/11/18


05:44 1/11/18


09:20 1/11/18


13:31


 


Lactic Acid Level


  0.6 mmol/L


(0.4-2.0) 


  


  


 


 


White Blood Count


  


  17.72 K/uL


(4.8-10.8) 


  


 


 


Red Blood Count


  


  4.56 M/uL


(4.2-5.4) 


  


 


 


Hemoglobin


  


  14.5 g/dL


(12.0-16.0) 


  


 


 


Hematocrit  43.3 % (37-47)   


 


Mean Corpuscular Volume


  


  95.0 fL


() 


  


 


 


Mean Corpuscular Hemoglobin


  


  31.8 pg


(25-34) 


  


 


 


Mean Corpuscular Hemoglobin


Concent 


  33.5 g/dl


(32-36) 


  


 


 


Platelet Count


  


  143 K/uL


(130-400) 


  


 


 


Mean Platelet Volume


  


  12.9 fL


(7.4-10.4) 


  


 


 


Neutrophils (%) (Auto)  67.2 %   


 


Lymphocytes (%) (Auto)  17.4 %   


 


Monocytes (%) (Auto)  13.1 %   


 


Eosinophils (%) (Auto)  1.7 %   


 


Basophils (%) (Auto)  0.3 %   


 


Neutrophils # (Auto)


  


  11.90 K/uL


(1.4-6.5) 


  


 


 


Lymphocytes # (Auto)


  


  3.09 K/uL


(1.2-3.4) 


  


 


 


Monocytes # (Auto)


  


  2.33 K/uL


(0.11-0.59) 


  


 


 


Eosinophils # (Auto)


  


  0.30 K/uL


(0-0.5) 


  


 


 


Basophils # (Auto)


  


  0.05 K/uL


(0-0.2) 


  


 


 


RDW Standard Deviation


  


  50.3 fL


(36.4-46.3) 


  


 


 


RDW Coefficient of Variation


  


  15.1 %


(11.5-14.5) 


  


 


 


Immature Granulocyte % (Auto)  0.3 %   


 


Immature Granulocyte # (Auto)


  


  0.05 K/uL


(0.00-0.02) 


  


 


 


Bedside Glucose


  


  


  97 mg/dl


(70-90) 


 











Imaging


CT head- No acute intracranial abnormality. Mild age-related atrophy and 

chronic small vessel change


CT chest- No evidence for aneurysm or dissection.  Moderate atherosclerotic 

change thoracic aorta.  Slight generalized interstitial prominence throughout 

both hemithoraces raising the possibility of a mild diffuse interstitial 

pneumonitis.


CTA neck- No significant stenosis, occlusion, or dissection identified within 

the carotid or vertebral arteries. 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 

bifurcation.


CTA brain-There is no hemorrhage, mass effect, or evidence of acute territorial 

ischemia by CT criteria.  Unremarkable CT angiogram of the brain.


MRI brain with and without- Multiple small foci of acute infarction within the 

right frontal and parietal lobes, measuring up to 2.1 cm. No significant mass 

effect. No evidence for hemorrhagic conversion.





Impression


77 year old female s/p CVA and administration of tPa left sided weakness





Plan


1. tPa protocol for starting plavix 75 mg and aspirin 81 mg


2. cardiology reviewed previous BUDDY and may have a potential for thrombus 


3. permissive hypertension for 48 hours


4. monitor for afib- will defer to cardiology for work up -need for 

anticoagulation vs plavix and aspirin


5. PT/OT for discharge needs will likely need inpatient rehab


6. LDL <70


7. repeat CT head in 24 hours after tPa and with MS change and after aspirin 

and plavix is started


8. will need DL, DM, and blood pressure control and monitoring


9 further recommendations to follow








I have seen and discussed above patient with Dr Falguni Camp, neurology


Pt seen and examined. No clear CN, speech, language, no RL confusion. LUE about 

#+ with drift, decreased LT L arm, but decreased T RLE, no extinction. Patchy R 

MCA infarct, suspicious for embolism. Follow-up CT head, no hemorrhage, rec 

dual antiplt tx. If no documented atrial dysrhymthia or embolic source rec 

outpt cardionet. Dr Reese will resume service tomorrow. SWATI Camp MD

## 2018-01-11 NOTE — CRITICAL CARE PROGRESS NOTE
Critical Care Progress Note


Date of Service


Jan 11, 2018.





ICU Day


ICU Day Number:  2





Attending


Dr. Chris





Subjective


Awake, alert, denies chest pain


Able to eat


Went for follow up CT brain





Objective


General: Elderly female, NAD


Heent: NC/AT


Lungs: CTA b/l


CVS: S1S2 reg


Abd: soft, no guarding


Ext: no edema


CNS: AAO x 3, mild left sided weakness, 4/5 LUE, 3-4/5 LLE. No downward drift 

observed





Consults & Procedures


Consultants:


Cardiology: Dr Khalil


Neurology: Dr Camp


Procedures:





CVA


NSTEMI


Back pain


HTN


PAD


Dyslipidemia





Plan


CNS: Repeat CT did not show any hemorrhage or evolution of CVA


Start ASA


Add Plavix


Suspect embolic source of CVA


No clear reason for the back pain, symptomatic treatment





CVS: Resume ASA


Troponin trending down


Echo reviewed


Cardiology consult noted


Reintroduce BP meds gently


Statin


Has known significant atheromatous disease of the aortic arch


Cardiac monitoring, r/o a-fib





Pulmonary: No acute issues





GI: Tolerating PO diet





Renal: Creatinine 0.7, stable





DVT prophylaxis: start Lovenox





Critical care time spent with patient, reviewing the chart, discussing with 

consultants, greater than 25 minutes





Data


Medications:





Current Inpatient Medications








 Medications


  (Trade)  Dose


 Ordered  Sig/Mikey


 Route  Start Time


 Stop Time Status Last Admin


Dose Admin


 


 Ioversol


  (Optiray 320)  111 ml  UD  PRN


 IV  1/10/18 11:30


 1/14/18 11:29   


 


 


 Atorvastatin


 Calcium


  (Lipitor Tab)  40 mg  QAM


 PO  1/11/18 09:00


 2/10/18 08:59  1/11/18 09:12


40 MG


 


 Miscellaneous


 Information


  (Pharmacist


 Discharge Med Rec


 Consult)  1 ea  UD  PRN


 N/A  1/10/18 15:00


 2/9/18 14:59   


 


 


 Acetaminophen


  (Tylenol Tab)  650 mg  Q4H  PRN


 PO  1/10/18 15:00


 2/9/18 14:59  1/11/18 05:59


650 MG


 


 Nitroglycerin


  (Nitrostat Tab)  0.4 mg  UD  PRN


 SL  1/10/18 15:00


 2/9/18 14:59   


 


 


 Morphine Sulfate


  (MoRPHine


 SULFATE INJ)  2 mg  Q6H  PRN


 IV  1/10/18 15:00


 1/24/18 14:59  1/11/18 13:57


2 MG


 


 Miscellaneous


 Information


  ([facility] Protocol


 For Hyperglycemia)  1 ea  PRN  PRN


 N/A  1/10/18 15:00


 1/12/18 14:59   


 


 


 Albuterol/


 Ipratropium


  (Duoneb)  3 ml  Q4H  PRN


 INH  1/10/18 15:15


 2/9/18 15:14   


 


 


 Albuterol


  (Ventolin Hfa


 Inhaler)  2 puffs  Q6H  PRN


 INH  1/10/18 15:15


 2/9/18 15:14   


 


 


 Gabapentin


  (Neurontin Cap)  300 mg  TID


 PO  1/10/18 21:00


 2/9/18 20:59  1/11/18 13:57


300 MG


 


 Metoprolol


 Succinate


  (Toprol Xl Tab)  25 mg  QAM


 PO  1/11/18 09:00


 2/10/18 08:59   


 


 


 Pantoprazole


 Sodium


  (Protonix Tab)  40 mg  DAILY


 PO  1/11/18 09:00


 2/10/18 08:59  1/11/18 09:12


40 MG


 


 Ondansetron HCl


  (Zofran Inj)  4 mg  Q6H  PRN


 IV  1/10/18 15:30


 2/9/18 15:29   


 


 


 Gadobutrol


  (Gadavist)  8 mmol  UD  PRN


 IV  1/10/18 20:00


 1/14/18 19:59   


 


 


 Sodium Chloride  1,000 ml @ 


 50 mls/hr  Q20H ONCE


 IV  1/10/18 22:45


 1/11/18 18:44  1/10/18 22:44


50 MLS/HR


 


 Aspirin


  (Ecotrin Tab)  81 mg  QAM


 PO  1/12/18 09:00


 2/11/18 08:59 UNV  


 


 


 Aspirin


  (Ecotrin Tab)  81 mg  1618  ONCE


 PO  1/11/18 16:18


 1/11/18 16:19 UNV  


 


 


 Enoxaparin Sodium


  (Lovenox Inj)  40 mg  QAM


 SQ  1/12/18 09:00


 2/11/18 08:59 UNV  


 


 


 Enoxaparin Sodium


  (Lovenox Inj)  40 mg  1618  ONCE


 SQ  1/11/18 16:18


 1/11/18 16:19 UNV  


 








I & O:











24-Hour Column 


 


 1/12/18





 08:00


 


Intake Total 952 ml


 


Output Total 450 ml


 


Balance 502 ml








Vital Signs:











  Date Time  Temp Pulse Resp B/P (MAP) Pulse Ox O2 Delivery O2 Flow Rate FiO2


 


1/11/18 14:05  65   99   


 


1/11/18 14:00  67 14 168/77 (107) 98 Nasal Cannula 2.0 


 


1/11/18 13:00  62 14 147/76 (99) 97 Nasal Cannula 2.0 


 


1/11/18 12:00      Nasal Cannula 2.0 


 


1/11/18 12:00 36.6 64 15 138/69 (92) 98 Nasal Cannula 2.0 


 


1/11/18 11:00  67 14 128/68 (88) 99 Nasal Cannula 2.0 


 


1/11/18 10:00  60 20 136/70 (92) 98 Nasal Cannula 2.0 


 


1/11/18 09:00  64 13 106/51 (69) 97 Nasal Cannula 2.0 


 


1/11/18 08:00 36.4 67 15 107/46 (66) 96 Nasal Cannula 2.0 


 


1/11/18 08:00      Nasal Cannula  


 


1/11/18 08:00      Nasal Cannula 2.0 


 


1/11/18 07:00  67 15 135/67 (89) 97 Nasal Cannula 2.0 


 


1/11/18 06:30  70 21 146/64 (91) 96 Nasal Cannula 2.0 


 


1/11/18 05:30  70 18 127/59 (81) 95 Nasal Cannula 2.0 


 


1/11/18 04:30  69 13 121/52 (75) 95 Nasal Cannula 2.0 


 


1/11/18 04:00      Nasal Cannula 2.0 


 


1/11/18 04:00 36.8       


 


1/11/18 03:30  69 17 140/76 (97) 98 Nasal Cannula 2.0 


 


1/11/18 02:30  75 17 132/70 (90) 98 Nasal Cannula 2.0 


 


1/11/18 01:30  69 17 138/66 (90) 98 Nasal Cannula 2.0 


 


1/11/18 00:30  69 17 135/67 (89) 97 Nasal Cannula 2.0 


 


1/11/18 00:00      Nasal Cannula 2.0 


 


1/11/18 00:00 36.7       


 


1/10/18 23:30  68 16 126/72 (90) 95 Nasal Cannula 2.0 


 


1/10/18 22:30  82 16 135/64 (87) 94 Nasal Cannula 2.0 


 


1/10/18 21:30  81 16 146/80 (102) 97 Nasal Cannula 2.0 


 


1/10/18 20:30  74 14 131/64 (86) 97 Nasal Cannula 2.0 


 


1/10/18 20:00 36.9  16 163/78 (106) 98 Nasal Cannula 2.0 


 


1/10/18 20:00      Nasal Cannula 2.0 


 


1/10/18 19:30  92 18 162/96 (118)  Nasal Cannula 2.0 


 


1/10/18 19:00  83 18 153/91 (111) 95 Nasal Cannula 2.0 


 


1/10/18 18:30  94 16 153/94 (113) 98   


 


1/10/18 18:00  80 16 165/78 (107) 94 Nasal Cannula 2.0 


 


1/10/18 17:30  80 19 159/77 (104) 89   


 


1/10/18 17:05 36.5 85 20 154/79 (104) 89 Room Air  








Laboratory Results:





Last 24 Hours








Test


  1/10/18


18:06 1/10/18


20:18 1/10/18


23:35 1/11/18


05:44


 


Bedside Glucose 96 mg/dl   101 mg/dl  


 


White Blood Count  20.88 K/uL   17.72 K/uL 


 


Red Blood Count  4.42 M/uL   4.56 M/uL 


 


Hemoglobin  14.0 g/dL   14.5 g/dL 


 


Hematocrit  41.2 %   43.3 % 


 


Mean Corpuscular Volume  93.2 fL   95.0 fL 


 


Mean Corpuscular Hemoglobin  31.7 pg   31.8 pg 


 


Mean Corpuscular Hemoglobin


Concent 


  34.0 g/dl 


  


  33.5 g/dl 


 


 


RDW Standard Deviation  48.5 fL   50.3 fL 


 


RDW Coefficient of Variation  14.3 %   15.1 % 


 


Platelet Count  262 K/uL   143 K/uL 


 


Mean Platelet Volume  11.3 fL   12.9 fL 


 


Lactic Acid Level  0.6 mmol/L   


 


Troponin I  10.100 ng/ml   


 


Neutrophils (%) (Auto)    67.2 % 


 


Lymphocytes (%) (Auto)    17.4 % 


 


Monocytes (%) (Auto)    13.1 % 


 


Eosinophils (%) (Auto)    1.7 % 


 


Basophils (%) (Auto)    0.3 % 


 


Neutrophils # (Auto)    11.90 K/uL 


 


Lymphocytes # (Auto)    3.09 K/uL 


 


Monocytes # (Auto)    2.33 K/uL 


 


Eosinophils # (Auto)    0.30 K/uL 


 


Basophils # (Auto)    0.05 K/uL 


 


Immature Granulocyte % (Auto)    0.3 % 


 


Immature Granulocyte # (Auto)    0.05 K/uL 


 


Test


  1/11/18


09:20 1/11/18


13:31 


  


 


 


Bedside Glucose 97 mg/dl    


 


Prothrombin Time  11.7 SECONDS   


 


Prothromb Time International


Ratio 


  1.1 


  


  


 


 


Sodium Level  140 mmol/L   


 


Potassium Level  4.0 mmol/L   


 


Chloride Level  106 mmol/L   


 


Carbon Dioxide Level  29 mmol/L   


 


Anion Gap  5.0 mmol/L   


 


Blood Urea Nitrogen  17 mg/dl   


 


Creatinine  0.70 mg/dl   


 


Est Creatinine Clear Calc


Drug Dose 


  65.5 ml/min 


  


  


 


 


Estimated GFR (


American) 


  98.2 


  


  


 


 


Estimated GFR (Non-


American 


  84.8 


  


  


 


 


BUN/Creatinine Ratio  23.6   


 


Random Glucose  119 mg/dl   


 


Calcium Level  8.5 mg/dl   


 


Phosphorus Level  3.8 mg/dl   


 


Magnesium Level  2.3 mg/dl   


 


Total Bilirubin  0.7 mg/dl   


 


Direct Bilirubin  0.1 mg/dl   


 


Aspartate Amino Transf


(AST/SGOT) 


  34 U/L 


  


  


 


 


Alanine Aminotransferase


(ALT/SGPT) 


  20 U/L 


  


  


 


 


Alkaline Phosphatase  79 U/L   


 


Troponin I  3.350 ng/ml   


 


Total Protein  6.2 gm/dl   


 


Albumin  2.4 gm/dl   


 


Triglycerides Level  129 mg/dl   


 


Cholesterol Level  138 mg/dl   


 


HDL Cholesterol  29 mg/dl   


 


LDL Cholesterol, Calculated  83 mg/dl   


 


VLDL Cholesterol, Calculated  26 mg/dl   


 


Cholesterol/HDL Ratio  4.8

## 2018-01-11 NOTE — PROGRESS NOTE
Internal Med Progress Note


Date of Service:


Jan 11, 2018.


Provider Documentation:





SUBJECTIVE:





The patient was seen and examined 


Admitted with Stroke symptoms and some nonspecific back pain 


Received TPA 


Remains stable in ICU 








OBJECTIVE:





Vital Signs-as noted below





Exam:


General-NO distress at rest


Eyes-normal


ENT-normal


Neck-supple


Lungs-Clear to ausucltate bilaterally 


Heart-Regular,no murmur appreciated


Abdomen-Benign,no masses,bowel sound present 


Extremities-No edema 


Neuro-AAOx3


No dysarthria


No facial asymmetry


Generally weak





Lab data as noted below.











CT Head:


No acute intracranial abnormality. Mild age-related atrophy and chronic small


vessel change





CXR:


1. Mild diffuse interstitial thickening which could be chronic.


2. The heart remains top normal in size.





CT dissection:


No evidence for aneurysm or dissection.


2. Moderate atherosclerotic change thoracic aorta.


3. Slight generalized interstitial prominence throughout both hemithoraces


raising the possibility of a mild diffuse interstitial pneumonitis.








CT angio Brain:


1. There is no hemorrhage, mass effect, or evidence of acute territorial


ischemia by CT criteria.


2. Unremarkable CT angiogram of the brain.





CTA neck:


No significant stenosis, occlusion, or dissection identified within the carotid


or vertebral arteries. Incidental is made of a high right carotid bifurcation.





ECHO:


Left ventricular systolic function is normal.


  *  Ejection Fraction = 65-70%.


  *  There is mild concentric left ventricular hypertrophy.


  *  The left atrium is moderately dilated.


  *  There is severe mitral annular calcification.


  *  There is mild mitral regurgitation.


  *  Aortic valve sclerosis moderate, without significant aortic valvular 

stenosis.


  *  Grade I diastolic dysfunction, (abnormal relaxation pattern).


ASSESSMENT & PLAN:








Acute CVA: S/P tPA


Admitted  in ICU 


CT head: showed no acute pathology


CTA Head, neck: No acute process


CT dissection: Negative


Speech and swallow eval


Continue Lipitor


Neuro checks, Neurology/Intensivist consulted 


Allow permissive HTN in setting of acute CVA 


Repeat CT head in AM


Avoid antiplatelets, anticoagulants for now 


Clinically better 











Back Pain:


H/O chronic back pain


CT dissection: Negative


Will get X ray of spine


Pain control


PT/OT





Abnormal ECG:


ST -T wave changes likely secondary to CVA


No regional wall motion abnormalities noted on ECHO


Troponin:Second set is significantly high at 10


Cardiology consulted-appreciate input 


Repeat ECHO tomorrow





 


Leukocytosis/Lactic acidosis:


No obvious source of infection


Normal Procalcitonin


CXR:Mild diffuse interstitial thickening which could be chronic


UA: no signs of UTI


IV fluids


Repeat Lactate levels


No Abx for now








PVD/ HLP:


Hold Aspirin


Continue Lipitor








HTN:


Hold HTN meds for permissive HTN








GERD


Continue PPI





CKD III


Cr at  baseline


monitor renal function





Ongoing Tobacco use:


 to quit smoking








DVT Px:


SCDs Re: tPA





Code Status:


Full Code


Vital Signs:











  Date Time  Temp Pulse Resp B/P (MAP) Pulse Ox O2 Delivery O2 Flow Rate FiO2


 


1/11/18 13:00  62 14 147/76 (99) 97 Nasal Cannula 2.0 


 


1/11/18 12:00      Nasal Cannula 2.0 


 


1/11/18 12:00 36.6 64 15 138/69 (92) 98 Nasal Cannula 2.0 


 


1/11/18 11:00  67 14 128/68 (88) 99 Nasal Cannula 2.0 


 


1/11/18 10:00  60 20 136/70 (92) 98 Nasal Cannula 2.0 


 


1/11/18 09:00  64 13 106/51 (69) 97 Nasal Cannula 2.0 


 


1/11/18 08:00 36.4 67 15 107/46 (66) 96 Nasal Cannula 2.0 


 


1/11/18 08:00      Nasal Cannula  


 


1/11/18 08:00      Nasal Cannula 2.0 


 


1/11/18 07:00  67 15 135/67 (89) 97 Nasal Cannula 2.0 


 


1/11/18 06:30  70 21 146/64 (91) 96 Nasal Cannula 2.0 


 


1/11/18 05:30  70 18 127/59 (81) 95 Nasal Cannula 2.0 


 


1/11/18 04:30  69 13 121/52 (75) 95 Nasal Cannula 2.0 


 


1/11/18 04:00      Nasal Cannula 2.0 


 


1/11/18 04:00 36.8       


 


1/11/18 03:30  69 17 140/76 (97) 98 Nasal Cannula 2.0 


 


1/11/18 02:30  75 17 132/70 (90) 98 Nasal Cannula 2.0 


 


1/11/18 01:30  69 17 138/66 (90) 98 Nasal Cannula 2.0 


 


1/11/18 00:30  69 17 135/67 (89) 97 Nasal Cannula 2.0 


 


1/11/18 00:00      Nasal Cannula 2.0 


 


1/11/18 00:00 36.7       


 


1/10/18 23:30  68 16 126/72 (90) 95 Nasal Cannula 2.0 


 


1/10/18 22:30  82 16 135/64 (87) 94 Nasal Cannula 2.0 


 


1/10/18 21:30  81 16 146/80 (102) 97 Nasal Cannula 2.0 


 


1/10/18 20:30  74 14 131/64 (86) 97 Nasal Cannula 2.0 


 


1/10/18 20:00 36.9  16 163/78 (106) 98 Nasal Cannula 2.0 


 


1/10/18 20:00      Nasal Cannula 2.0 


 


1/10/18 19:30  92 18 162/96 (118)  Nasal Cannula 2.0 


 


1/10/18 19:00  83 18 153/91 (111) 95 Nasal Cannula 2.0 


 


1/10/18 18:30  94 16 153/94 (113) 98   


 


1/10/18 18:00  80 16 165/78 (107) 94 Nasal Cannula 2.0 


 


1/10/18 17:30  80 19 159/77 (104) 89   


 


1/10/18 17:05 36.5 85 20 154/79 (104) 89 Room Air  


 


1/10/18 16:30 37.0 81 18 164/73 99  2.0 


 


1/10/18 16:00  82 18 156/76 99 Room Air  


 


1/10/18 15:30  82 18 143/73 98 Room Air  


 


1/10/18 15:00  81 18 153/69 97 Room Air  


 


1/10/18 14:30  77 18 175/78 99 Nasal Cannula 3.0 


 


1/10/18 14:15  81 18 177/76 96 Nasal Cannula 3.0 


 


1/10/18 14:15     99 Nasal Cannula 3.0 


 


1/10/18 14:01  168      


 


1/10/18 14:01  72      


 


1/10/18 14:00  84 16 148/85 98 Nasal Cannula 3.0 








Lab Results:





Results Past 24 Hours








Test


  1/10/18


15:43 1/10/18


18:06 1/10/18


20:18 1/10/18


23:35 Range/Units


 


 


Lactic Acid Level 2.4  0.6  0.4-2.0  mmol/L


 


Bedside Glucose  96  101 70-90  mg/dl


 


White Blood Count   20.88  4.8-10.8  K/uL


 


Red Blood Count   4.42  4.2-5.4  M/uL


 


Hemoglobin   14.0  12.0-16.0  g/dL


 


Hematocrit   41.2  37-47  %


 


Mean Corpuscular Volume   93.2    fL


 


Mean Corpuscular Hemoglobin   31.7  25-34  pg


 


Mean Corpuscular Hemoglobin


Concent 


  


  34.0


  


  32-36  g/dl


 


 


RDW Standard Deviation   48.5  36.4-46.3  fL


 


RDW Coefficient of Variation   14.3  11.5-14.5  %


 


Platelet Count   262  130-400  K/uL


 


Mean Platelet Volume   11.3  7.4-10.4  fL


 


Troponin I   10.100  0-0.045  ng/ml


 


Test


  1/11/18


05:44 1/11/18


09:20 1/11/18


13:31 


  Range/Units


 


 


White Blood Count 17.72    4.8-10.8  K/uL


 


Red Blood Count 4.56    4.2-5.4  M/uL


 


Hemoglobin 14.5    12.0-16.0  g/dL


 


Hematocrit 43.3    37-47  %


 


Mean Corpuscular Volume 95.0      fL


 


Mean Corpuscular Hemoglobin 31.8    25-34  pg


 


Mean Corpuscular Hemoglobin


Concent 33.5


  


  


  


  32-36  g/dl


 


 


Platelet Count 143    130-400  K/uL


 


Mean Platelet Volume 12.9    7.4-10.4  fL


 


Neutrophils (%) (Auto) 67.2     %


 


Lymphocytes (%) (Auto) 17.4     %


 


Monocytes (%) (Auto) 13.1     %


 


Eosinophils (%) (Auto) 1.7     %


 


Basophils (%) (Auto) 0.3     %


 


Neutrophils # (Auto) 11.90    1.4-6.5  K/uL


 


Lymphocytes # (Auto) 3.09    1.2-3.4  K/uL


 


Monocytes # (Auto) 2.33    0.11-0.59  K/uL


 


Eosinophils # (Auto) 0.30    0-0.5  K/uL


 


Basophils # (Auto) 0.05    0-0.2  K/uL


 


RDW Standard Deviation 50.3    36.4-46.3  fL


 


RDW Coefficient of Variation 15.1    11.5-14.5  %


 


Immature Granulocyte % (Auto) 0.3     %


 


Immature Granulocyte # (Auto) 0.05    0.00-0.02  K/uL


 


Bedside Glucose  97   70-90  mg/dl

## 2018-01-11 NOTE — CARDIOLOGY CONSULTATION
DATE OF CONSULTATION:  01/11/2018

 

DATE OF CONSULTATION:  01/11/2018  

 

REASON FOR CONSULTATION:  Elevated troponin.

 

REFERRING PHYSICIAN:  Dr. Cortes.

 

HISTORY OF PRESENT ILLNESS:  Ms. Obando is a 77-year-old female with a

history of peripheral vascular disease status post lower extremity bypass,

hypertension, high grade aortic atheroma, severe mitral annular calcification

with  history of previous possible vegetation and transesophageal echo in

2013 as well as CKD.  She presented to the Emergency Department with severe

back pain involving the middle of her back as well as left hand weakness. 

The patient was evaluated by [facility] and TPA was administered. 

There were some concerns regarding peaked T-waves as well as some nonspecific

ST changes.  Her MRI confirms the presence of small multiple foci of

infarction  of the right frontal and parietal lobes measuring up to 2.1 cm. 

The patient has improved post-TPA infusion.  There are no signs of

hemorrhagic conversion.  There are no signs of significant arterial stenosis

or dissection per  imaging studies.  Initial troponin was 0.31 with a repeat

troponin performed last evening of 10.10.  Currently, she is resting

comfortably.  She denies chest pain or shortness of breath.  The left upper

extremity weakness has improved and she has passed her  swallow evaluation. 

Denies a prior history of coronary disease, congestive heart failure, 

history of peripheral vascular disease as noted below.  Episode of paroxysmal

atrial tachycardia noted on telemetry.  No evidence of atrial fibrillation.

 

REVIEW OF SYSTEMS:  The pertinent positive noted above, a comprehensive

10-system review is otherwise negative.

 

PAST MEDICAL HISTORY:

1. Peripheral vascular disease status post lower extremity fem-pop bypass

surgery.

2. Dyslipidemia.

3. 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

4. Severe mitral annular calcification with questionable vegetation, which

was excluded per transesophageal echo 2013.

5. Hypertension.

6. Chronic dyspnea on exertion.

 

PAST SURGICAL HISTORY:

1. Transesophageal echo.

2. Bilateral oophorectomy.

3. Breast biopsy.

4. Appendectomy.

5. Cataract surgery.  

6. Colonoscopy.

7. Fem-pop bypass.

 

FAMILY HISTORY:  Coronary disease in her father as well as history of

cerebrovascular accident.  No premature CAD or sudden cardiac death.

 

SOCIAL HISTORY:  She is a current everyday smoker.  She is  and lives

with her family.  She is retired.

 

ALLERGIES:  ADHESIVES, BENZOIN, HYDROGEN PEROXIDE, CEFTRIAXONE.

 

HOME MEDICATIONS:

1. Norvasc 5 mg daily.

2. Aspirin 81 mg daily.  

3. Folvite 1 mg daily.

4. Neurontin 600 mg t.i.d.

5. Zestril 40 mg daily.

6. Toprol-XL 25 mg daily.

7. Omeprazole 20 mg daily.

8. Zocor 40 mg daily.  

9. Tylenol 650 every 4 hours as need.  

10. Albuterol 2-4 puffs every 6 hours as needed.  

 

ECG on admission is sinus rhythm with peaked T-waves, nonspecific ST

abnormality.

 

Initial troponin as noted above.

 

LABORATORY DATA:  Sodium 137, potassium 4.4, chloride is 103, CO2 is 29, BUN

is 16, creatinine is 0.85.  INR is 1.0.  White blood cell count 17.72,

hemoglobin is 14.5, platelet count 143.

 

PHYSICAL EXAMINATION:

VITAL SIGNS:  Temperature is 36.4 degrees centigrade, pulse 60 beats per

minute and regular, respiratory rate 20 breaths per minute, blood pressure

136/70, SAO2 is 98% on 2 liters.

GENERAL:  NAD, awake, alert and oriented x3.

HEAD, EYES, EARS, NOSE, AND THROAT:  Mucous membranes are moist.  No scleral

icterus.  Conjunctivae pink.

NECK:  Supple.  There is no JVD.  No HJR, no carotid bruit.

HEART:  Regular with a normal S1 and S2.  There is no murmur, rub, or gallop.

LUNGS:  Clear without rales, rhonchi or wheeze.

ABDOMEN:  Soft, nontender.  No rebound or guarding.  Normal bowel sounds.

EXTREMITIES:  Warm and dry without clubbing or cyanosis.  There is trace

bilateral pedal edema.

NEUROLOGIC EXAMINATION:  Demonstrates left upper extremity weakness.

 

FINAL IMPRESSION:

1. Acute multiple small foci of infarction involving the right frontal and

parietal lobes, status post tissue plasminogen activator.

2. Elevated troponin without ischemic ECG changes or evidence of regional

wall motion abnormality per  resting 2D transthoracic echo.

3. Peripheral vascular disease with history of prior fem-pop bypass followed

by vascular surgery at [facility].

4. Dyslipidemia.

5. Hypertension.

6. Active tobacco abuse.

7. Leukocytosis, no evidence of acute infection currently.

 

PLAN AND RECOMMENDATIONS:  Recommend starting aspirin when allowable per

protocols post-TPA infusion.  Continue to monitor telemetry for evidence of

paroxysmal dysrhythmias, specifically undiagnosed atrial fibrillation which

has not been documented to date.  Review of previous imaging including

transesophageal echo demonstrates a potential source for stroke including

severe atheroma of the aortic arch.  Currently, there is not an  overt  

indication for anticoagulation from a cardiology perspective.  Beta blocker

will remain on hold to allow blood pressure  to be more robust in the first

48 hours post cerebrovascular accident.  We will repeat her ECG in a.m. 

Continue statin therapy at this time.

 

Thank you for allowing me to take part in the care of your patient.

## 2018-01-11 NOTE — DIAGNOSTIC IMAGING REPORT
THORACIC SPINE 3 VIEWS ROUTINE



CLINICAL HISTORY: Back pain. Left upper extremity weakness.    



COMPARISON STUDY:  Chest CT January 10, 2018.



FINDINGS: Alignment of the thoracic spine is anatomic. Vertebral body heights

are maintained. There is no fracture. There is mild multilevel degenerative disc

disease with disc space narrowing and osteophytosis. There may be mild right

infrahilar opacity.



IMPRESSION:  



1. No acute thoracic spine fracture or subluxation.



2. 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 









Electronically signed by:  Broderick Nolan M.D.

1/11/2018 2:42 PM



Dictated Date/Time:  1/11/2018 2:41 PM

## 2018-01-11 NOTE — DIAGNOSTIC IMAGING REPORT
HEAD WITHOUT CONTRAST (CT)



CT DOSE: 537.48 mGy.cm



HISTORY: Stroke  S/P t-PA for Stroke. Evaluate hemorrhage



TECHNIQUE: Multiaxial CT images of the head were performed without the use of

intravenous contrast.  A dose lowering technique was utilized adhering to the

principles of ALARA.





Comparison: MRI brain 1/10/2018. CT brain 1/10/2018



Findings: The paranasal sinuses and mastoid air cells are clear. The calvarium

and skull base are intact. The ventricles and sulci are within normal limits.

There is no mass, hematoma, midline shift, or acute infarct. MRI findings are

not seen by CT criteria possibly due to their small size. This study is

unchanged compared to the prior CT evaluation.



Impression:

Chronic and age-related change. No acute intracranial hemorrhage. No change from

the prior CT exam











The above report was generated using voice recognition software.  It may contain

grammatical, syntax or spelling errors.







Electronically signed by:  Chava Figueroa M.D.

1/11/2018 4:08 PM



Dictated Date/Time:  1/11/2018 4:04 PM

## 2018-01-12 VITALS
HEART RATE: 67 BPM | SYSTOLIC BLOOD PRESSURE: 125 MMHG | OXYGEN SATURATION: 92 % | DIASTOLIC BLOOD PRESSURE: 68 MMHG | TEMPERATURE: 99.14 F

## 2018-01-12 VITALS
SYSTOLIC BLOOD PRESSURE: 124 MMHG | OXYGEN SATURATION: 93 % | DIASTOLIC BLOOD PRESSURE: 62 MMHG | HEART RATE: 63 BPM | TEMPERATURE: 99.68 F

## 2018-01-12 VITALS
HEART RATE: 64 BPM | TEMPERATURE: 98.06 F | SYSTOLIC BLOOD PRESSURE: 134 MMHG | OXYGEN SATURATION: 97 % | DIASTOLIC BLOOD PRESSURE: 64 MMHG

## 2018-01-12 VITALS — TEMPERATURE: 98.96 F

## 2018-01-12 VITALS — DIASTOLIC BLOOD PRESSURE: 66 MMHG | HEART RATE: 69 BPM | OXYGEN SATURATION: 97 % | SYSTOLIC BLOOD PRESSURE: 137 MMHG

## 2018-01-12 VITALS
DIASTOLIC BLOOD PRESSURE: 64 MMHG | OXYGEN SATURATION: 93 % | HEART RATE: 63 BPM | SYSTOLIC BLOOD PRESSURE: 111 MMHG | TEMPERATURE: 98.42 F

## 2018-01-12 VITALS — SYSTOLIC BLOOD PRESSURE: 163 MMHG | HEART RATE: 69 BPM | OXYGEN SATURATION: 99 % | DIASTOLIC BLOOD PRESSURE: 85 MMHG

## 2018-01-12 VITALS — OXYGEN SATURATION: 93 % | SYSTOLIC BLOOD PRESSURE: 147 MMHG | DIASTOLIC BLOOD PRESSURE: 64 MMHG | HEART RATE: 78 BPM

## 2018-01-12 VITALS
TEMPERATURE: 98.6 F | HEART RATE: 67 BPM | DIASTOLIC BLOOD PRESSURE: 69 MMHG | SYSTOLIC BLOOD PRESSURE: 149 MMHG | OXYGEN SATURATION: 96 %

## 2018-01-12 VITALS
HEART RATE: 65 BPM | DIASTOLIC BLOOD PRESSURE: 64 MMHG | SYSTOLIC BLOOD PRESSURE: 111 MMHG | TEMPERATURE: 98.42 F | OXYGEN SATURATION: 95 %

## 2018-01-12 VITALS
DIASTOLIC BLOOD PRESSURE: 69 MMHG | SYSTOLIC BLOOD PRESSURE: 149 MMHG | HEART RATE: 78 BPM | OXYGEN SATURATION: 96 % | TEMPERATURE: 98.6 F

## 2018-01-12 VITALS — DIASTOLIC BLOOD PRESSURE: 69 MMHG | SYSTOLIC BLOOD PRESSURE: 119 MMHG | OXYGEN SATURATION: 93 % | HEART RATE: 67 BPM

## 2018-01-12 VITALS — OXYGEN SATURATION: 97 % | DIASTOLIC BLOOD PRESSURE: 57 MMHG | HEART RATE: 70 BPM | SYSTOLIC BLOOD PRESSURE: 129 MMHG

## 2018-01-12 VITALS — SYSTOLIC BLOOD PRESSURE: 161 MMHG | OXYGEN SATURATION: 97 % | DIASTOLIC BLOOD PRESSURE: 78 MMHG | HEART RATE: 69 BPM

## 2018-01-12 VITALS
OXYGEN SATURATION: 96 % | HEART RATE: 60 BPM | TEMPERATURE: 99.68 F | DIASTOLIC BLOOD PRESSURE: 82 MMHG | SYSTOLIC BLOOD PRESSURE: 132 MMHG

## 2018-01-12 VITALS — TEMPERATURE: 97.16 F

## 2018-01-12 VITALS — OXYGEN SATURATION: 96 % | DIASTOLIC BLOOD PRESSURE: 64 MMHG | HEART RATE: 79 BPM | SYSTOLIC BLOOD PRESSURE: 153 MMHG

## 2018-01-12 LAB
BASOPHILS # BLD: 0.06 K/UL (ref 0–0.2)
BASOPHILS NFR BLD: 0.4 %
BUN SERPL-MCNC: 12 MG/DL (ref 7–18)
CALCIUM SERPL-MCNC: 8.7 MG/DL (ref 8.5–10.1)
CO2 SERPL-SCNC: 28 MMOL/L (ref 21–32)
CREAT SERPL-MCNC: 0.74 MG/DL (ref 0.6–1.2)
EOS ABS #: 0.41 K/UL (ref 0–0.5)
EOSINOPHIL NFR BLD AUTO: 256 K/UL (ref 130–400)
GLUCOSE SERPL-MCNC: 87 MG/DL (ref 70–99)
HCT VFR BLD CALC: 41.6 % (ref 37–47)
HGB BLD-MCNC: 14.4 G/DL (ref 12–16)
IG#: 0.03 K/UL (ref 0–0.02)
IMM GRANULOCYTES NFR BLD AUTO: 18 %
INR PPP: 1 (ref 0.9–1.1)
LYMPHOCYTES # BLD: 2.69 K/UL (ref 1.2–3.4)
MCH RBC QN AUTO: 32.2 PG (ref 25–34)
MCHC RBC AUTO-ENTMCNC: 34.6 G/DL (ref 32–36)
MCV RBC AUTO: 93.1 FL (ref 80–100)
MONO ABS #: 2.17 K/UL (ref 0.11–0.59)
MONOCYTES NFR BLD: 14.6 %
NEUT ABS #: 9.55 K/UL (ref 1.4–6.5)
NEUTROPHILS # BLD AUTO: 2.7 %
NEUTROPHILS NFR BLD AUTO: 64.1 %
PHOSPHATE SERPL-MCNC: 3.3 MG/DL (ref 2.5–4.9)
PMV BLD AUTO: 11.4 FL (ref 7.4–10.4)
POTASSIUM SERPL-SCNC: 3.7 MMOL/L (ref 3.5–5.1)
RED CELL DISTRIBUTION WIDTH CV: 14.1 % (ref 11.5–14.5)
RED CELL DISTRIBUTION WIDTH SD: 47.7 FL (ref 36.4–46.3)
SODIUM SERPL-SCNC: 139 MMOL/L (ref 136–145)
WBC # BLD AUTO: 14.91 K/UL (ref 4.8–10.8)

## 2018-01-12 RX ADMIN — GABAPENTIN SCH MG: 300 CAPSULE ORAL at 13:31

## 2018-01-12 RX ADMIN — DOCUSATE SODIUM SCH MG: 100 CAPSULE, LIQUID FILLED ORAL at 20:38

## 2018-01-12 RX ADMIN — Medication SCH MG: at 09:29

## 2018-01-12 RX ADMIN — CLOPIDOGREL BISULFATE SCH MG: 75 TABLET, FILM COATED ORAL at 09:29

## 2018-01-12 RX ADMIN — ENOXAPARIN SODIUM SCH MG: 40 INJECTION SUBCUTANEOUS at 09:30

## 2018-01-12 RX ADMIN — METOPROLOL SUCCINATE SCH MG: 25 TABLET, EXTENDED RELEASE ORAL at 09:36

## 2018-01-12 RX ADMIN — GABAPENTIN SCH MG: 300 CAPSULE ORAL at 09:29

## 2018-01-12 RX ADMIN — PANTOPRAZOLE SCH MG: 40 TABLET, DELAYED RELEASE ORAL at 09:29

## 2018-01-12 RX ADMIN — ATORVASTATIN CALCIUM SCH MG: 40 TABLET, FILM COATED ORAL at 09:29

## 2018-01-12 RX ADMIN — DOCUSATE SODIUM SCH MG: 100 CAPSULE, LIQUID FILLED ORAL at 09:29

## 2018-01-12 RX ADMIN — GABAPENTIN SCH MG: 300 CAPSULE ORAL at 20:38

## 2018-01-12 RX ADMIN — MORPHINE SULFATE PRN MG: 2 INJECTION, SOLUTION INTRAMUSCULAR; INTRAVENOUS at 04:33

## 2018-01-12 NOTE — PROGRESS NOTE
Internal Med Progress Note


Date of Service:


Jan 12, 2018.


Provider Documentation:





SUBJECTIVE:





The patient was seen and examined 


Admitted with Stroke symptoms and some nonspecific back pain 


Received TPA 


Remains stable in ICU 


Complains of  some back and neck pain 








OBJECTIVE:





Vital Signs-as noted below





Exam:


General-No distress at rest


Eyes-normal


ENT-normal


Neck-supple


Lungs-Clear to ausucltate bilaterally 


Heart-Regular,no murmur appreciated


Abdomen-Benign,no masses,bowel sound present 


Extremities-No edema 


Neuro-AAOx3


No dysarthria


No facial asymmetry


Generally weak


No focal neuro deficit


Lab data as noted below.











CT Head:


No acute intracranial abnormality. Mild age-related atrophy and chronic small


vessel change





CXR:


1. Mild diffuse interstitial thickening which could be chronic.


2. The heart remains top normal in size.





CT dissection:


No evidence for aneurysm or dissection.


2. Moderate atherosclerotic change thoracic aorta.


3. Slight generalized interstitial prominence throughout both hemithoraces


raising the possibility of a mild diffuse interstitial pneumonitis.








CT angio Brain:


1. There is no hemorrhage, mass effect, or evidence of acute territorial


ischemia by CT criteria.


2. Unremarkable CT angiogram of the brain.





CTA neck:


No significant stenosis, occlusion, or dissection identified within the carotid


or vertebral arteries. Incidental is made of a high right carotid bifurcation.





ECHO:


Left ventricular systolic function is normal.


  *  Ejection Fraction = 65-70%.


  *  There is mild concentric left ventricular hypertrophy.


  *  The left atrium is moderately dilated.


  *  There is severe mitral annular calcification.


  *  There is mild mitral regurgitation.


  *  Aortic valve sclerosis moderate, without significant aortic valvular 

stenosis.


  *  Grade I diastolic dysfunction, (abnormal relaxation pattern).





MRI::IMPRESSION:  Multiple small foci of acute infarction within the right 

frontal


and parietal lobes, measuring up to 2.1 cm. No significant mass effect. No


evidence for hemorrhagic conversion.


Repeat CT of the Head-negative for any Hemorrhage





ASSESSMENT & PLAN:








Acute CVA: S/P tPA


Admitted  in ICU 


CT head: showed no acute pathology


CTA Head, neck: No acute process


CT dissection: Negative


Speech and swallow eval


Continue Lipitor


Neuro checks, Neurology/Intensivist consulted 


Allow permissive HTN in setting of acute CVA 


Repeat CT head in AM and MRI as above 


Has been started on Plavix and Aspirin 


Clinically much better 











Back Pain and Neck pain


H/O chronic back pain


CT dissection: Negative


Will get X ray of spine-Has OA ,otherwise nothing significant


Pain control


PT/OT





Abnormal ECG:with significant increase in Troponin


ST -T wave changes likely secondary to CVA


No regional wall motion abnormalities noted on ECHO


Troponin:Second set is significantly high at 10


Cardiology consulted-appreciate input 


Troponin is trending down





 


Leukocytosis/Lactic acidosis:


No obvious source of infection


Normal Procalcitonin


CXR:Mild diffuse interstitial thickening which could be chronic


UA: no signs of UTI


IV fluids


Repeat Lactate levels


No Abx for now








PVD/ HLP:


Hold Aspirin-restarted


Continue Lipitor








HTN:


Hold HTN meds for permissive HTN








GERD


Continue PPI





CKD III


Cr at  baseline


monitor renal function





Ongoing Tobacco use:


 to quit smoking








DVT Px:


SCDs Re: tPA





Code Status:


Full Code


Will transfer to Tele


Vital Signs:











  Date Time  Temp Pulse Resp B/P (MAP) Pulse Ox O2 Delivery O2 Flow Rate FiO2


 


1/12/18 12:00     96 Room Air  


 


1/12/18 12:00 36.9 63 16 111/64 (80) 93 Room Air  


 


1/12/18 10:35  67   93   


 


1/12/18 10:00  67 22 119/69 (86) 93 Room Air  


 


1/12/18 08:00     96 Nasal Cannula 2.0 


 


1/12/18 08:00      Nasal Cannula  


 


1/12/18 08:00 37.0 78 22 149/69 (95) 96 Nasal Cannula 2.0 


 


1/12/18 06:01  69 15 163/85 (120) 99   


 


1/12/18 05:01  69 12 161/78 (126) 97   


 


1/12/18 04:00     97 Nasal Cannula 2.0 


 


1/12/18 04:00  64 13 134/64 (94) 96   


 


1/12/18 04:00 36.7       


 


1/12/18 03:00  78 11 147/64 (115) 93   


 


1/12/18 02:00  70 15 129/57 (80) 97   


 


1/12/18 01:00  69 14 137/66 (94) 97   


 


1/12/18 00:01 37.2       


 


1/12/18 00:00  79 12 153/64 (97) 96   


 


1/11/18 23:59     97 Nasal Cannula 2.0 


 


1/11/18 23:00  74 15 137/60 (97) 97   


 


1/11/18 22:00  76 13 146/72 (110) 97   


 


1/11/18 21:00  72 12 151/73 (103) 97   


 


1/11/18 20:00 37.0       


 


1/11/18 20:00  74 17 152/73 (111) 95   


 


1/11/18 20:00     98 Room Air  


 


1/11/18 19:00  78 20 171/62 (92) 96   


 


1/11/18 18:00  72 20 149/70 (96) 98 Room Air  


 


1/11/18 17:00  71 18 151/69 (96) 98 Room Air  


 


1/11/18 16:10      Room Air  


 


1/11/18 16:00 36.7 71 12 149/69 (95) 98 Room Air  


 


1/11/18 14:05  65   99   


 


1/11/18 14:00  67 14 168/77 (107) 98 Nasal Cannula 2.0 








Lab Results:





Results Past 24 Hours








Test


  1/11/18


13:31 1/11/18


16:37 1/11/18


22:14 1/11/18


23:14 Range/Units


 


 


Prothrombin Time


  11.7


  


  


  


  9.0-12.0


SECONDS


 


Prothromb Time International


Ratio 1.1


  


  


  


  0.9-1.1  


 


 


Sodium Level 140    136-145  mmol/L


 


Potassium Level 4.0    3.5-5.1  mmol/L


 


Chloride Level 106      mmol/L


 


Carbon Dioxide Level 29    21-32  mmol/L


 


Anion Gap 5.0    3-11  mmol/L


 


Blood Urea Nitrogen 17    7-18  mg/dl


 


Creatinine


  0.70


  


  


  


  0.60-1.20


mg/dl


 


Est Creatinine Clear Calc


Drug Dose 65.5


  


  


  


   ml/min


 


 


Estimated GFR (


American) 98.2


  


  


  


   


 


 


Estimated GFR (Non-


American 84.8


  


  


  


   


 


 


BUN/Creatinine Ratio 23.6    10-20  


 


Random Glucose 119    70-99  mg/dl


 


Calcium Level 8.5    8.5-10.1  mg/dl


 


Phosphorus Level 3.8    2.5-4.9  mg/dl


 


Magnesium Level 2.3    1.8-2.4  mg/dl


 


Total Bilirubin 0.7    0.2-1  mg/dl


 


Direct Bilirubin 0.1    0-0.2  mg/dl


 


Aspartate Amino Transf


(AST/SGOT) 34


  


  


  


  15-37  U/L


 


 


Alanine Aminotransferase


(ALT/SGPT) 20


  


  


  


  12-78  U/L


 


 


Alkaline Phosphatase 79      U/L


 


Troponin I 3.350  2.660  0-0.045  ng/ml


 


Total Protein 6.2    6.4-8.2  gm/dl


 


Albumin 2.4    3.4-5.0  gm/dl


 


Triglycerides Level 129    0-150  mg/dl


 


Cholesterol Level 138    0-200  mg/dl


 


HDL Cholesterol 29     mg/dl


 


LDL Cholesterol, Calculated 83     mg/dl


 


VLDL Cholesterol, Calculated 26     mg/dl


 


Cholesterol/HDL Ratio 4.8     


 


Bedside Glucose  125  99 70-90  mg/dl


 


Test


  1/12/18


06:03 1/12/18


06:16 


  


  Range/Units


 


 


Bedside Glucose 87    70-90  mg/dl


 


White Blood Count  14.91   4.8-10.8  K/uL


 


Red Blood Count  4.47   4.2-5.4  M/uL


 


Hemoglobin  14.4   12.0-16.0  g/dL


 


Hematocrit  41.6   37-47  %


 


Mean Corpuscular Volume  93.1     fL


 


Mean Corpuscular Hemoglobin  32.2   25-34  pg


 


Mean Corpuscular Hemoglobin


Concent 


  34.6


  


  


  32-36  g/dl


 


 


Platelet Count  256   130-400  K/uL


 


Mean Platelet Volume  11.4   7.4-10.4  fL


 


Neutrophils (%) (Auto)  64.1    %


 


Lymphocytes (%) (Auto)  18.0    %


 


Monocytes (%) (Auto)  14.6    %


 


Eosinophils (%) (Auto)  2.7    %


 


Basophils (%) (Auto)  0.4    %


 


Neutrophils # (Auto)  9.55   1.4-6.5  K/uL


 


Lymphocytes # (Auto)  2.69   1.2-3.4  K/uL


 


Monocytes # (Auto)  2.17   0.11-0.59  K/uL


 


Eosinophils # (Auto)  0.41   0-0.5  K/uL


 


Basophils # (Auto)  0.06   0-0.2  K/uL


 


RDW Standard Deviation  47.7   36.4-46.3  fL


 


RDW Coefficient of Variation  14.1   11.5-14.5  %


 


Immature Granulocyte % (Auto)  0.2    %


 


Immature Granulocyte # (Auto)  0.03   0.00-0.02  K/uL


 


Platelet Estimate  NORMAL    


 


Prothrombin Time


  


  10.7


  


  


  9.0-12.0


SECONDS


 


Prothromb Time International


Ratio 


  1.0


  


  


  0.9-1.1  


 


 


Sodium Level  139   136-145  mmol/L


 


Potassium Level  3.7   3.5-5.1  mmol/L


 


Chloride Level  106     mmol/L


 


Carbon Dioxide Level  28   21-32  mmol/L


 


Anion Gap  5.0   3-11  mmol/L


 


Blood Urea Nitrogen  12   7-18  mg/dl


 


Creatinine


  


  0.74


  


  


  0.60-1.20


mg/dl


 


Est Creatinine Clear Calc


Drug Dose 


  62.0


  


  


   ml/min


 


 


Estimated GFR (


American) 


  91.9


  


  


   


 


 


Estimated GFR (Non-


American 


  79.3


  


  


   


 


 


BUN/Creatinine Ratio  16.6   10-20  


 


Random Glucose  87   70-99  mg/dl


 


Calcium Level  8.7   8.5-10.1  mg/dl


 


Phosphorus Level  3.3   2.5-4.9  mg/dl


 


Magnesium Level  2.2   1.8-2.4  mg/dl

## 2018-01-12 NOTE — PROGRESS NOTE
DATE: 01/12/2018

 

SUBJECTIVE:  Satnam is 77 years old, has been seen by Falguni Engel and

Falguni Camp over the past 36 hours after having been admitted with the

acute onset of left upper extremity weakness and numbness and was found to

have a series of presumptively embolic strokes involving the right frontal

and parietal lobes.  Workup to date has shown nothing other than some

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

of these presumption of emboli.  She has not been shown to be in atrial

fibrillation and the actual carotid systems are free of significant

atheromatous changes.  She is currently being transferred from the ICU to the

second floor where she will be continued to be monitored for atrial

arrhythmias and is on aspirin and Plavix.  A repeat CT scan after her TPA

shows no significant infarctions but as the radiologist pointed out the

embolic events were very small and may have been missed on CT.  The

importance is that there is no hemorrhage at this time.

 

OBJECTIVE:  Reveals the presence of a mild left upper extremity drift and

clumsiness.  There may be a slight left upper motor neuron facial asymmetry. 

The patient has a slightly dazed expression, but otherwise is conversant and

oriented.  There is no neglect or bilaterally presented visual or

somatosensory stimulation and all that we see clinically is a motor weakness

which is mild and improved.

 

IMPRESSION AND PLAN:  At this time, unless we do find evidence for paroxysmal

atrial fibrillation, I would simply continue dual antiplatelet therapy for 3

months and have her followed up on an outpatient basis by neurology.  The

source of these clots indeed may be the aorta, but at this point I really am

not in favor of placing her on novel anticoagulant or Coumadin as a treatment

in my opinion for aortic plaque remains antiplatelet drugs.

 

I will check with her tomorrow.

 

 

 

 

KARENA

## 2018-01-13 VITALS
OXYGEN SATURATION: 95 % | HEART RATE: 64 BPM | DIASTOLIC BLOOD PRESSURE: 75 MMHG | TEMPERATURE: 98.78 F | SYSTOLIC BLOOD PRESSURE: 126 MMHG

## 2018-01-13 VITALS
SYSTOLIC BLOOD PRESSURE: 114 MMHG | HEART RATE: 58 BPM | DIASTOLIC BLOOD PRESSURE: 67 MMHG | OXYGEN SATURATION: 94 % | TEMPERATURE: 98.42 F

## 2018-01-13 VITALS
HEART RATE: 63 BPM | SYSTOLIC BLOOD PRESSURE: 144 MMHG | OXYGEN SATURATION: 95 % | DIASTOLIC BLOOD PRESSURE: 82 MMHG | TEMPERATURE: 99.14 F

## 2018-01-13 VITALS
HEART RATE: 70 BPM | DIASTOLIC BLOOD PRESSURE: 78 MMHG | TEMPERATURE: 97.7 F | OXYGEN SATURATION: 96 % | SYSTOLIC BLOOD PRESSURE: 127 MMHG

## 2018-01-13 VITALS
OXYGEN SATURATION: 95 % | HEART RATE: 63 BPM | DIASTOLIC BLOOD PRESSURE: 82 MMHG | SYSTOLIC BLOOD PRESSURE: 144 MMHG | TEMPERATURE: 99.14 F

## 2018-01-13 VITALS — OXYGEN SATURATION: 95 %

## 2018-01-13 VITALS
TEMPERATURE: 97.7 F | SYSTOLIC BLOOD PRESSURE: 129 MMHG | OXYGEN SATURATION: 96 % | DIASTOLIC BLOOD PRESSURE: 72 MMHG | HEART RATE: 66 BPM

## 2018-01-13 VITALS
TEMPERATURE: 98.24 F | HEART RATE: 64 BPM | OXYGEN SATURATION: 92 % | DIASTOLIC BLOOD PRESSURE: 73 MMHG | SYSTOLIC BLOOD PRESSURE: 130 MMHG

## 2018-01-13 LAB
BASOPHILS # BLD: 0.06 K/UL (ref 0–0.2)
BASOPHILS NFR BLD: 0.4 %
BUN SERPL-MCNC: 19 MG/DL (ref 7–18)
CALCIUM SERPL-MCNC: 8.9 MG/DL (ref 8.5–10.1)
CO2 SERPL-SCNC: 28 MMOL/L (ref 21–32)
CREAT SERPL-MCNC: 0.95 MG/DL (ref 0.6–1.2)
EOS ABS #: 0.41 K/UL (ref 0–0.5)
EOSINOPHIL NFR BLD AUTO: 289 K/UL (ref 130–400)
GLUCOSE SERPL-MCNC: 101 MG/DL (ref 70–99)
HCT VFR BLD CALC: 43.3 % (ref 37–47)
HGB BLD-MCNC: 14.6 G/DL (ref 12–16)
IG#: 0.07 K/UL (ref 0–0.02)
IMM GRANULOCYTES NFR BLD AUTO: 19.6 %
INR PPP: 1 (ref 0.9–1.1)
LYMPHOCYTES # BLD: 2.88 K/UL (ref 1.2–3.4)
MCH RBC QN AUTO: 31.1 PG (ref 25–34)
MCHC RBC AUTO-ENTMCNC: 33.7 G/DL (ref 32–36)
MCV RBC AUTO: 92.1 FL (ref 80–100)
MONO ABS #: 1.58 K/UL (ref 0.11–0.59)
MONOCYTES NFR BLD: 10.8 %
NEUT ABS #: 9.67 K/UL (ref 1.4–6.5)
NEUTROPHILS # BLD AUTO: 2.8 %
NEUTROPHILS NFR BLD AUTO: 65.9 %
PHOSPHATE SERPL-MCNC: 3.2 MG/DL (ref 2.5–4.9)
PMV BLD AUTO: 11.8 FL (ref 7.4–10.4)
POTASSIUM SERPL-SCNC: 4.1 MMOL/L (ref 3.5–5.1)
RED CELL DISTRIBUTION WIDTH CV: 13.8 % (ref 11.5–14.5)
RED CELL DISTRIBUTION WIDTH SD: 46.9 FL (ref 36.4–46.3)
SODIUM SERPL-SCNC: 139 MMOL/L (ref 136–145)
WBC # BLD AUTO: 14.67 K/UL (ref 4.8–10.8)

## 2018-01-13 RX ADMIN — METOPROLOL SUCCINATE SCH MG: 25 TABLET, EXTENDED RELEASE ORAL at 08:09

## 2018-01-13 RX ADMIN — PANTOPRAZOLE SCH MG: 40 TABLET, DELAYED RELEASE ORAL at 08:09

## 2018-01-13 RX ADMIN — ENOXAPARIN SODIUM SCH MG: 40 INJECTION SUBCUTANEOUS at 08:21

## 2018-01-13 RX ADMIN — GABAPENTIN SCH MG: 300 CAPSULE ORAL at 08:09

## 2018-01-13 RX ADMIN — GABAPENTIN SCH MG: 300 CAPSULE ORAL at 21:57

## 2018-01-13 RX ADMIN — CLOPIDOGREL BISULFATE SCH MG: 75 TABLET, FILM COATED ORAL at 08:09

## 2018-01-13 RX ADMIN — ATORVASTATIN CALCIUM SCH MG: 40 TABLET, FILM COATED ORAL at 08:09

## 2018-01-13 RX ADMIN — Medication SCH MG: at 08:09

## 2018-01-13 RX ADMIN — DOCUSATE SODIUM SCH MG: 100 CAPSULE, LIQUID FILLED ORAL at 21:57

## 2018-01-13 RX ADMIN — DOCUSATE SODIUM SCH MG: 100 CAPSULE, LIQUID FILLED ORAL at 08:09

## 2018-01-13 RX ADMIN — GABAPENTIN SCH MG: 300 CAPSULE ORAL at 14:23

## 2018-01-13 NOTE — PROGRESS NOTE
DATE: 01/13/2018

 

DATE:  01/13/2018  

 

SUBJECTIVE:  Angelica has been moved up to the floor, but there is still no

documentation of atrial arrhythmias.  I was in error yesterday in

interpreting the echocardiogram.  There was no echo imaging on the aortic

arch, but there was aortic valve narrowing and mitral annular calcification,

both of which might have been the source of some platelet fiber of emboli and

could have caused the right hemispheric events.  There is certainly very

little on exam today other than some clumsiness of the left hand, a mild

drift and perhaps some slight left facial asymmetry and a little articulatory

disturbance to indicate the presence of the small strokes.  I do not know if

she has had an adequate physical therapy evaluation.  If not, she probably

needs one and they can assess whether or not it would be safe to get her home

or to make recommendations regarding potential [facility] stay.  She is not

wild about the second idea and apparently was told she might be able to go

home today and I have no objections, but I would send her home on dual

antiplatelet therapy with aspirin and Plavix and have her follow up with

neurology in about a month or so.  She could actually  be seen by Falguni Engel PA-C and then either Dr. Camp or  I can make an assessment and

further recommendations.  She may be someone who does need a CardioNet

monitor.  She does have atrial enlargement and may be someone who is going in

and out of atrial fibrillation but we have not detected any thus far.  It may

require a several week outpatient monitoring however  to document this and

obviously  then the anticoagulation program would change from antiplatelets

to either a  novel anticoagulant for Coumadin.

 

 

 

 

KARENA

## 2018-01-13 NOTE — PROGRESS NOTE
Internal Med Progress Note


Date of Service:


Jan 13, 2018.


Provider Documentation:





SUBJECTIVE:





The patient was seen and examined 


Admitted with Stroke symptoms and some nonspecific back pain 


Received TPA 


Remains stable 


Generally weak but no focality








OBJECTIVE:





Vital Signs-as noted below





Exam:


General-No distress at rest


Eyes-normal


ENT-normal


Neck-supple


Lungs-Clear to ausucltate bilaterally 


Heart-Regular,no murmur appreciated


Abdomen-Benign,no masses,bowel sound present 


Extremities-No edema 


Neuro-AAOx3


No dysarthria


No facial asymmetry


Generally weak


No focal neuro deficit














CT Head:


No acute intracranial abnormality. Mild age-related atrophy and chronic small


vessel change





CXR:


1. Mild diffuse interstitial thickening which could be chronic.


2. The heart remains top normal in size.





CT dissection:


No evidence for aneurysm or dissection.


2. Moderate atherosclerotic change thoracic aorta.


3. Slight generalized interstitial prominence throughout both hemithoraces


raising the possibility of a mild diffuse interstitial pneumonitis.








CT angio Brain:


1. There is no hemorrhage, mass effect, or evidence of acute territorial


ischemia by CT criteria.


2. Unremarkable CT angiogram of the brain.





CTA neck:


No significant stenosis, occlusion, or dissection identified within the carotid


or vertebral arteries. Incidental is made of a high right carotid bifurcation.





ECHO:


Left ventricular systolic function is normal.


  *  Ejection Fraction = 65-70%.


  *  There is mild concentric left ventricular hypertrophy.


  *  The left atrium is moderately dilated.


  *  There is severe mitral annular calcification.


  *  There is mild mitral regurgitation.


  *  Aortic valve sclerosis moderate, without significant aortic valvular 

stenosis.


  *  Grade I diastolic dysfunction, (abnormal relaxation pattern).





MRI::IMPRESSION:  Multiple small foci of acute infarction within the right 

frontal


and parietal lobes, measuring up to 2.1 cm. No significant mass effect. No


evidence for hemorrhagic conversion.


Repeat CT of the Head-negative for any Hemorrhage





ASSESSMENT & PLAN:








Acute CVA: S/P tPA


Admitted  in ICU 


CT head: showed no acute pathology


CTA Head, neck: No acute process


CT dissection: Negative


Speech and swallow eval


Continue Lipitor


Neuro checks, Neurology/Intensivist consulted 


Allow permissive HTN in setting of acute CVA 


Repeat CT head in AM and MRI as above 


Has been started on Plavix and Aspirin 


Clinically much better and neurologycally improved a lot


Continue PT 


 











Back Pain and Neck pain


H/O chronic back pain


CT dissection: Negative


Will get X ray of spine-Has OA ,otherwise nothing significant


Pain control


PT/OT-continued


Denies any pain today 





Abnormal ECG:with significant increase in Troponin


ST -T wave changes likely secondary to CVA


No regional wall motion abnormalities noted on ECHO


Troponin:Second set is significantly high at 10


Cardiology consulted-appreciate input 


Troponin is trending down


No cardiac symptoms





 


Leukocytosis/Lactic acidosis:


No obvious source of infection


Normal Procalcitonin


CXR:Mild diffuse interstitial thickening which could be chronic


UA: no signs of UTI


IV fluids


Repeat Lactate levels


No Abx for now


No Infective source 








PVD/ HLP:


Hold Aspirin-restarted


Continue Lipitor








HTN:


Hold HTN meds for permissive HTN








GERD


Continue PPI





CKD III


Cr at  baseline


monitor renal function





Ongoing Tobacco use:


 to quit smoking








DVT Px:


SCDs Re: tPA





Code Status:


Full Code


Will transfer to [facility]


Continue PT/Ot 


Likely discharge on Monday


Vital Signs:











  Date Time  Temp Pulse Resp B/P (MAP) Pulse Ox O2 Delivery O2 Flow Rate FiO2


 


1/13/18 12:03 36.9 58 18 114/67 (83) 94 Room Air  


 


1/13/18 12:00      Room Air  


 


1/13/18 08:00     95 Room Air  


 


1/13/18 07:29 37.1 64 19 126/75 (92) 95 Room Air  


 


1/13/18 04:00      Room Air  


 


1/13/18 02:57 36.8 64 20 130/73 (92) 92 Room Air  


 


1/12/18 23:59      Room Air  


 


1/12/18 23:03 37.6 63 22 124/62 (82) 93 Room Air  


 


1/12/18 20:00      Room Air  


 


1/12/18 19:44 37.3 67 20 125/68 (87) 92 Room Air  


 


1/12/18 16:02 36.2       


 


1/12/18 16:00      Room Air  


 


1/12/18 15:38 37.6 60 16 132/82 (99) 96 Room Air  








Lab Results:





Results Past 24 Hours








Test


  1/13/18


06:35 Range/Units


 


 


White Blood Count 14.67 4.8-10.8  K/uL


 


Red Blood Count 4.70 4.2-5.4  M/uL


 


Hemoglobin 14.6 12.0-16.0  g/dL


 


Hematocrit 43.3 37-47  %


 


Mean Corpuscular Volume 92.1   fL


 


Mean Corpuscular Hemoglobin 31.1 25-34  pg


 


Mean Corpuscular Hemoglobin


Concent 33.7


  32-36  g/dl


 


 


Platelet Count 289 130-400  K/uL


 


Mean Platelet Volume 11.8 7.4-10.4  fL


 


Neutrophils (%) (Auto) 65.9  %


 


Lymphocytes (%) (Auto) 19.6  %


 


Monocytes (%) (Auto) 10.8  %


 


Eosinophils (%) (Auto) 2.8  %


 


Basophils (%) (Auto) 0.4  %


 


Neutrophils # (Auto) 9.67 1.4-6.5  K/uL


 


Lymphocytes # (Auto) 2.88 1.2-3.4  K/uL


 


Monocytes # (Auto) 1.58 0.11-0.59  K/uL


 


Eosinophils # (Auto) 0.41 0-0.5  K/uL


 


Basophils # (Auto) 0.06 0-0.2  K/uL


 


RDW Standard Deviation 46.9 36.4-46.3  fL


 


RDW Coefficient of Variation 13.8 11.5-14.5  %


 


Immature Granulocyte % (Auto) 0.5  %


 


Immature Granulocyte # (Auto) 0.07 0.00-0.02  K/uL


 


Prothrombin Time


  10.0


  9.0-12.0


SECONDS


 


Prothromb Time International


Ratio 1.0


  0.9-1.1  


 


 


Sodium Level 139 136-145  mmol/L


 


Potassium Level 4.1 3.5-5.1  mmol/L


 


Chloride Level 106   mmol/L


 


Carbon Dioxide Level 28 21-32  mmol/L


 


Anion Gap 5.0 3-11  mmol/L


 


Blood Urea Nitrogen 19 7-18  mg/dl


 


Creatinine


  0.95


  0.60-1.20


mg/dl


 


Est Creatinine Clear Calc


Drug Dose 48.8


   ml/min


 


 


Estimated GFR (


American) 67.9


   


 


 


Estimated GFR (Non-


American 58.6


   


 


 


BUN/Creatinine Ratio 19.6 10-20  


 


Random Glucose 101 70-99  mg/dl


 


Calcium Level 8.9 8.5-10.1  mg/dl


 


Phosphorus Level 3.2 2.5-4.9  mg/dl


 


Magnesium Level 2.4 1.8-2.4  mg/dl

## 2018-01-13 NOTE — DIAGNOSTIC IMAGING REPORT
CHEST 2 VIEWS ROUTINE



CLINICAL HISTORY: r/o Pneumonia dyspnea



COMPARISON STUDY:  1/10/2018



FINDINGS: Scattered platelike atelectasis bilaterally. No focal infiltrate.

Diaphragms are smooth. 



IMPRESSION:  Minimal scattered platelike atelectasis. Otherwise negative study

with no focal infiltrate 











The above report was generated using voice recognition software.  It may contain

grammatical, syntax or spelling errors.









Electronically signed by:  Chava Figueroa M.D.

1/13/2018 10:51 AM



Dictated Date/Time:  1/13/2018 10:51 AM

## 2018-01-14 VITALS — DIASTOLIC BLOOD PRESSURE: 78 MMHG | SYSTOLIC BLOOD PRESSURE: 128 MMHG

## 2018-01-14 VITALS
DIASTOLIC BLOOD PRESSURE: 60 MMHG | TEMPERATURE: 98.06 F | OXYGEN SATURATION: 96 % | HEART RATE: 56 BPM | SYSTOLIC BLOOD PRESSURE: 107 MMHG

## 2018-01-14 VITALS
OXYGEN SATURATION: 91 % | SYSTOLIC BLOOD PRESSURE: 118 MMHG | DIASTOLIC BLOOD PRESSURE: 64 MMHG | TEMPERATURE: 98.06 F | HEART RATE: 67 BPM

## 2018-01-14 VITALS
DIASTOLIC BLOOD PRESSURE: 70 MMHG | TEMPERATURE: 97.7 F | HEART RATE: 55 BPM | SYSTOLIC BLOOD PRESSURE: 124 MMHG | OXYGEN SATURATION: 93 %

## 2018-01-14 VITALS — DIASTOLIC BLOOD PRESSURE: 76 MMHG | SYSTOLIC BLOOD PRESSURE: 142 MMHG | HEART RATE: 82 BPM

## 2018-01-14 VITALS — OXYGEN SATURATION: 91 %

## 2018-01-14 RX ADMIN — CLOPIDOGREL BISULFATE SCH MG: 75 TABLET, FILM COATED ORAL at 08:33

## 2018-01-14 RX ADMIN — ACETAMINOPHEN PRN MG: 325 TABLET ORAL at 20:06

## 2018-01-14 RX ADMIN — Medication SCH MG: at 08:33

## 2018-01-14 RX ADMIN — GABAPENTIN SCH MG: 300 CAPSULE ORAL at 20:03

## 2018-01-14 RX ADMIN — ATORVASTATIN CALCIUM SCH MG: 40 TABLET, FILM COATED ORAL at 08:33

## 2018-01-14 RX ADMIN — DOCUSATE SODIUM SCH MG: 100 CAPSULE, LIQUID FILLED ORAL at 08:33

## 2018-01-14 RX ADMIN — PANTOPRAZOLE SCH MG: 40 TABLET, DELAYED RELEASE ORAL at 08:34

## 2018-01-14 RX ADMIN — ENOXAPARIN SODIUM SCH MG: 40 INJECTION SUBCUTANEOUS at 08:34

## 2018-01-14 RX ADMIN — GABAPENTIN SCH MG: 300 CAPSULE ORAL at 13:49

## 2018-01-14 RX ADMIN — DOCUSATE SODIUM SCH MG: 100 CAPSULE, LIQUID FILLED ORAL at 20:03

## 2018-01-14 RX ADMIN — METOPROLOL SUCCINATE SCH MG: 25 TABLET, EXTENDED RELEASE ORAL at 08:33

## 2018-01-14 RX ADMIN — GABAPENTIN SCH MG: 300 CAPSULE ORAL at 08:33

## 2018-01-14 NOTE — PROGRESS NOTE
DATE: 01/14/2018

 

SUBJECTIVE:  Angelica was seen today with her family.  She insisted she was

going to go home today but after discussion and observation of her gait and 
general demenor,w both family members and I agree that she is not

ready to be placed back in her home environment.  I watched her walk today,

she has a mild left upper extremity dysfunction, some slight facial asymmetry

and left leg maybe slightly clumsy, but she is really requiring a walker for

ambulation and her daughter is there for security, so this is very different

than her status at home.  Physical therapy has apparently recommended a brief

stay in an inpatient rehabilitation facility and investigations run away with

her insurance regarding which facility might be covered.  At this point, she 
needs

to stay a little longer and probably needs at least a week or perhaps 10 days

in a rehabilitation facility to get back to her baseline and we are going to

treat her simply with aspirin and Plavix and see her back in neurology in

probably 3-4 weeks' time.  At that point, we probably will order a Cardionet

monitoring or a Zio patch, just to see if there is any paroxysmal atrial

fibrillation as there has been no clear unequivocal source with an embolic

shower in lower and left hemisphere other than perhaps some calcified mitral

annulus and aortic valve stenosis, but she does have an enlarged left atrium

and a risk for paroxysmal atrial fibrillation would appear to be reasonably

high.  If this were the case, obviously another form of anticoagulation would

be favored over aspirin and Plavix.  Will check back with her tomorrow, but

at some point will probably sign off the case as we are not adding any

further value at this point.

 

 

 

 

KARENA

## 2018-01-14 NOTE — PROGRESS NOTE
Internal Med Progress Note


Date of Service:


Jan 14, 2018.


Provider Documentation:





SUBJECTIVE:





The patient was seen and examined 


Admitted with Stroke symptoms and some nonspecific back pain 


Received TPA 


Remains stable 


Generally weak but no focality


Getting a little dizzy at times 








OBJECTIVE:





Vital Signs-as noted below





Exam:


General-No distress at rest


Eyes-normal


ENT-normal


Neck-supple


Lungs-Clear to ausucltate bilaterally 


Heart-Regular,no murmur appreciated


Abdomen-Benign,no masses,bowel sound present 


Extremities-No edema 


Neuro-AAOx3


No dysarthria


No facial asymmetry


Generally weak


No focal neuro deficit














CT Head:


No acute intracranial abnormality. Mild age-related atrophy and chronic small


vessel change





CXR:


1. Mild diffuse interstitial thickening which could be chronic.


2. The heart remains top normal in size.





CT dissection:


No evidence for aneurysm or dissection.


2. Moderate atherosclerotic change thoracic aorta.


3. Slight generalized interstitial prominence throughout both hemithoraces


raising the possibility of a mild diffuse interstitial pneumonitis.








CT angio Brain:


1. There is no hemorrhage, mass effect, or evidence of acute territorial


ischemia by CT criteria.


2. Unremarkable CT angiogram of the brain.





CTA neck:


No significant stenosis, occlusion, or dissection identified within the carotid


or vertebral arteries. Incidental is made of a high right carotid bifurcation.





ECHO:


Left ventricular systolic function is normal.


  *  Ejection Fraction = 65-70%.


  *  There is mild concentric left ventricular hypertrophy.


  *  The left atrium is moderately dilated.


  *  There is severe mitral annular calcification.


  *  There is mild mitral regurgitation.


  *  Aortic valve sclerosis moderate, without significant aortic valvular 

stenosis.


  *  Grade I diastolic dysfunction, (abnormal relaxation pattern).





MRI::IMPRESSION:  Multiple small foci of acute infarction within the right 

frontal


and parietal lobes, measuring up to 2.1 cm. No significant mass effect. No


evidence for hemorrhagic conversion.


Repeat CT of the Head-negative for any Hemorrhage





ASSESSMENT & PLAN:








Acute CVA: S/P tPA


Admitted  in ICU 


CT head: showed no acute pathology


CTA Head, neck: No acute process


CT dissection: Negative


Speech and swallow eval


Continue Lipitor


Neuro checks, Neurology/Intensivist consulted 


Allow permissive HTN in setting of acute CVA 


Repeat CT head in AM and MRI as above 


Has been started on Plavix and Aspirin 


Clinically much better and neurologically improved a lot


Continue PT


May need Rehab 


Plavix and Aspirin for now ,OP Zio patch to document any AF 


If any AF ,will need to change Anticoagulation 


 


 











Back Pain and Neck pain


H/O chronic back pain


CT dissection: Negative


Will get X ray of spine-Has OA ,otherwise nothing significant


Pain control


PT/OT-continued


Denies any pain today 





Abnormal ECG:with significant increase in Troponin


ST -T wave changes likely secondary to CVA


No regional wall motion abnormalities noted on ECHO


Troponin:Second set is significantly high at 10


Cardiology consulted-appreciate input 


Troponin is trending down


No cardiac symptoms





 


Leukocytosis/Lactic acidosis:


No obvious source of infection


Normal Procalcitonin


CXR:Mild diffuse interstitial thickening which could be chronic


UA: no signs of UTI


IV fluids


Repeat Lactate levels


No Abx for now


No Infective source 








PVD/ HLP:


Hold Aspirin-restarted


Continue Lipitor








HTN:


Hold HTN meds for permissive HTN








GERD


Continue PPI





CKD III


Cr at  baseline


monitor renal function





Ongoing Tobacco use:


 to quit smoking








DVT Px:


SCDs Re: tPA





Code Status:


Full Code


Will transfer to [facility]


Continue PT/Ot 


Likely discharge on Monday


Vital Signs:











  Date Time  Temp Pulse Resp B/P (MAP) Pulse Ox O2 Delivery O2 Flow Rate FiO2


 


1/14/18 08:00     91 Room Air  


 


1/14/18 06:43 36.7 67 18 118/64 (82) 91 Room Air  


 


1/14/18 03:45  82  142/76 (98)    


 


1/13/18 23:45      Room Air  


 


1/13/18 23:32 36.5 70 16 127/78 (94) 96 Room Air  


 


1/13/18 18:35 36.5 66 17 129/72 (91) 96 Room Air  


 


1/13/18 16:52 37.3 63 18  95   


 


1/13/18 16:00     95 Room Air  


 


1/13/18 15:39 37.3 63 18 144/82 (102) 95 Room Air  








Lab Results:





Results Past 24 Hours








Test


  1/13/18


19:55 1/14/18


07:42 Range/Units


 


 


Bedside Glucose 108 92 70-90  mg/dl

## 2018-01-14 NOTE — PROGRESS NOTE
Post ICU Progress Note


Date & Time


Jan 14, 2018 at 20:57





Vital Signs





Vital Signs Past 12 Hours








  Date Time  Temp Pulse Resp B/P (MAP) Pulse Ox O2 Delivery O2 Flow Rate FiO2


 


1/14/18 17:00      Room Air  


 


1/14/18 15:30 36.5 55 17 124/70 (88) 93 Room Air  











Notes


Mental Status:  alert / awake


Nausea / Vomiting:  adequately controlled


Pain:  adequately controlled


Airway Patency, RR, SpO2:  stable & adequate


BP & HR:  stable & adequate


Patient is a 77-year-old female initially admitted to the ICU after receiving 

TPA for an ischemic RIGHT sided infarct.  She was left with a slight LEFT-sided 

deficit.  Her stay was complicated by a presumed NSTEMI.  Conservative measures 

have been instituted this point.  The patient will follow-up cardiology and 

outpatient setting.  She is left with a slight LEFT-sided deficit.  Otherwise, 

the patient reports feeling much better.  She is having much less back 

discomfort.  The patient is optimistic that she will be discharged home with 

help from her daughter.  She offers no complaints at this point.





Consider outpatient follow up in 1 to 2 weeks with:  Cardiology, Neurology





Repeat imaging needed:  Per admitting services.





Follow up cultures:  None





Reviewed progress notes, labs, and inpatient medication list





Continue current management





Additional recommendations:  None at this time.





Thank you for allowing us to participate in the care of this patient. At this 

time, Critical Care Services will sign off on this patient. Please feel free to 

reconsult as needed.





Consults & Procedures


Consultants:


Cardiology: Dr Khalil


Neurology: Dr Camp


Procedures:





CVA


NSTEMI


Back pain


HTN


PAD


Dyslipidemia





Plan


CNS: Repeat CT did not show any hemorrhage or evolution of CVA


Start ASA


Add Plavix


Suspect embolic source of CVA


No clear reason for the back pain, symptomatic treatment





CVS: Resume ASA


Troponin trending down


Echo reviewed


Cardiology consult noted


Reintroduce BP meds gently


Statin


Has known significant atheromatous disease of the aortic arch


Cardiac monitoring, r/o a-fib





Pulmonary: No acute issues





GI: Tolerating PO diet





Renal: Creatinine 0.7, stable





DVT prophylaxis: start Lovenox





Critical care time spent with patient, reviewing the chart, discussing with 

consultants, greater than 25 minutes

## 2018-01-15 VITALS — OXYGEN SATURATION: 96 %

## 2018-01-15 VITALS
SYSTOLIC BLOOD PRESSURE: 127 MMHG | DIASTOLIC BLOOD PRESSURE: 70 MMHG | HEART RATE: 58 BPM | OXYGEN SATURATION: 96 % | TEMPERATURE: 97.88 F

## 2018-01-15 VITALS — OXYGEN SATURATION: 97 %

## 2018-01-15 VITALS
SYSTOLIC BLOOD PRESSURE: 108 MMHG | TEMPERATURE: 97.88 F | DIASTOLIC BLOOD PRESSURE: 61 MMHG | OXYGEN SATURATION: 97 % | HEART RATE: 64 BPM

## 2018-01-15 VITALS
DIASTOLIC BLOOD PRESSURE: 55 MMHG | OXYGEN SATURATION: 97 % | HEART RATE: 59 BPM | TEMPERATURE: 98.24 F | SYSTOLIC BLOOD PRESSURE: 111 MMHG

## 2018-01-15 LAB
BUN SERPL-MCNC: 19 MG/DL (ref 7–18)
CALCIUM SERPL-MCNC: 8.9 MG/DL (ref 8.5–10.1)
CO2 SERPL-SCNC: 26 MMOL/L (ref 21–32)
CREAT SERPL-MCNC: 0.79 MG/DL (ref 0.6–1.2)
EOSINOPHIL NFR BLD AUTO: 274 K/UL (ref 130–400)
GLUCOSE SERPL-MCNC: 104 MG/DL (ref 70–99)
HCT VFR BLD CALC: 44.7 % (ref 37–47)
HGB BLD-MCNC: 15.2 G/DL (ref 12–16)
MCH RBC QN AUTO: 31.8 PG (ref 25–34)
MCHC RBC AUTO-ENTMCNC: 34 G/DL (ref 32–36)
MCV RBC AUTO: 93.5 FL (ref 80–100)
PHOSPHATE SERPL-MCNC: 3.1 MG/DL (ref 2.5–4.9)
PMV BLD AUTO: 11.5 FL (ref 7.4–10.4)
POTASSIUM SERPL-SCNC: 4.1 MMOL/L (ref 3.5–5.1)
RED CELL DISTRIBUTION WIDTH CV: 14.1 % (ref 11.5–14.5)
RED CELL DISTRIBUTION WIDTH SD: 47.7 FL (ref 36.4–46.3)
SODIUM SERPL-SCNC: 143 MMOL/L (ref 136–145)
WBC # BLD AUTO: 10.86 K/UL (ref 4.8–10.8)

## 2018-01-15 RX ADMIN — DOCUSATE SODIUM SCH MG: 100 CAPSULE, LIQUID FILLED ORAL at 07:43

## 2018-01-15 RX ADMIN — GABAPENTIN SCH MG: 300 CAPSULE ORAL at 07:43

## 2018-01-15 RX ADMIN — PANTOPRAZOLE SCH MG: 40 TABLET, DELAYED RELEASE ORAL at 07:42

## 2018-01-15 RX ADMIN — ENOXAPARIN SODIUM SCH MG: 40 INJECTION SUBCUTANEOUS at 07:43

## 2018-01-15 RX ADMIN — METOPROLOL SUCCINATE SCH MG: 25 TABLET, EXTENDED RELEASE ORAL at 07:43

## 2018-01-15 RX ADMIN — ATORVASTATIN CALCIUM SCH MG: 40 TABLET, FILM COATED ORAL at 07:43

## 2018-01-15 RX ADMIN — GABAPENTIN SCH MG: 300 CAPSULE ORAL at 20:02

## 2018-01-15 RX ADMIN — GABAPENTIN SCH MG: 300 CAPSULE ORAL at 14:35

## 2018-01-15 RX ADMIN — DOCUSATE SODIUM SCH MG: 100 CAPSULE, LIQUID FILLED ORAL at 20:02

## 2018-01-15 RX ADMIN — CLOPIDOGREL BISULFATE SCH MG: 75 TABLET, FILM COATED ORAL at 07:43

## 2018-01-15 RX ADMIN — Medication SCH MG: at 07:43

## 2018-01-15 NOTE — PROGRESS NOTE
Internal Med Progress Note


Date of Service:


Lopez 15, 2018.


Provider Documentation:





SUBJECTIVE:





The patient was seen and examined 


Admitted with Stroke symptoms and some nonspecific back pain 


Received TPA 


Remains stable  and getting PT


Likely to need Rehab for a short term








OBJECTIVE:





Vital Signs-as noted below





Exam:


General-No distress at rest


Eyes-normal


ENT-normal


Neck-supple


Lungs-Clear to ausucltate bilaterally 


Heart-Regular,no murmur appreciated


Abdomen-Benign,no masses,bowel sound present 


Extremities-No edema 


Neuro-AAOx3


No dysarthria


No facial asymmetry


Generally weak


No focal neuro deficit














CT Head:


No acute intracranial abnormality. Mild age-related atrophy and chronic small


vessel change





CXR:


1. Mild diffuse interstitial thickening which could be chronic.


2. The heart remains top normal in size.





CT dissection:


No evidence for aneurysm or dissection.


2. Moderate atherosclerotic change thoracic aorta.


3. Slight generalized interstitial prominence throughout both hemithoraces


raising the possibility of a mild diffuse interstitial pneumonitis.








CT angio Brain:


1. There is no hemorrhage, mass effect, or evidence of acute territorial


ischemia by CT criteria.


2. Unremarkable CT angiogram of the brain.





CTA neck:


No significant stenosis, occlusion, or dissection identified within the carotid


or vertebral arteries. Incidental is made of a high right carotid bifurcation.





ECHO:


Left ventricular systolic function is normal.


  *  Ejection Fraction = 65-70%.


  *  There is mild concentric left ventricular hypertrophy.


  *  The left atrium is moderately dilated.


  *  There is severe mitral annular calcification.


  *  There is mild mitral regurgitation.


  *  Aortic valve sclerosis moderate, without significant aortic valvular 

stenosis.


  *  Grade I diastolic dysfunction, (abnormal relaxation pattern).





MRI::IMPRESSION:  Multiple small foci of acute infarction within the right 

frontal


and parietal lobes, measuring up to 2.1 cm. No significant mass effect. No


evidence for hemorrhagic conversion.


Repeat CT of the Head-negative for any Hemorrhage





ASSESSMENT & PLAN:








Acute CVA: S/P tPA


Admitted  in ICU 


CT head: showed no acute pathology


CTA Head, neck: No acute process


CT dissection: Negative


Speech and swallow eval


Continue Lipitor


Neuro checks, Neurology/Intensivist consulted 


Allow permissive HTN in setting of acute CVA 


Repeat CT head in AM and MRI as above 


Has been started on Plavix and Aspirin 


Clinically much better and neurologically improved a lot


Plavix and Aspirin for now ,OP Zio patch to document any AF 


If any AF ,will need to change Anticoagulation 


Continue PT/OT -will need rehab for a short term


Patient is reluctant but after discussion agreed to it  


 


Back Pain and Neck pain


H/O chronic back pain


CT dissection: Negative


Will get X ray of spine-Has OA ,otherwise nothing significant


Pain control


PT/OT-continued


Denies any pain today 





Abnormal ECG:with significant increase in Troponin


ST -T wave changes likely secondary to CVA


No regional wall motion abnormalities noted on ECHO


Troponin:Second set is significantly high at 10


Cardiology consulted-appreciate input 


Troponin is trending down


No cardiac symptoms





 


Leukocytosis/Lactic acidosis:


No obvious source of infection


Normal Procalcitonin


CXR:Mild diffuse interstitial thickening which could be chronic


UA: no signs of UTI


IV fluids


Repeat Lactate levels


No Abx for now


No Infective source 








PVD/ HLP:


Hold Aspirin-restarted


Continue Lipitor








HTN:


Hold HTN meds for permissive HTN








GERD


Continue PPI





CKD III


Cr at  baseline


monitor renal function





Ongoing Tobacco use:


 to quit smoking








DVT Px:


SCDs Re: tPA





Code Status:


Full Code


Will transfer to [facility]


Continue PT/Ot 


awaiting placement


Vital Signs:











  Date Time  Temp Pulse Resp B/P (MAP) Pulse Ox O2 Delivery O2 Flow Rate FiO2


 


1/15/18 07:30     96 Room Air  


 


1/15/18 07:20 36.6 58 22 127/70 (89) 96 Room Air  


 


1/15/18 00:00      Room Air  


 


1/14/18 23:01 36.7 56 16 107/60 (76) 96 Room Air  


 


1/14/18 17:00      Room Air  


 


1/14/18 15:30 36.5 55 17 124/70 (88) 93 Room Air  








Lab Results:





Results Past 24 Hours








Test


  1/14/18


19:50 1/15/18


08:06 Range/Units


 


 


Bedside Glucose 104  70-90  mg/dl


 


White Blood Count  10.86 4.8-10.8  K/uL


 


Red Blood Count  4.78 4.2-5.4  M/uL


 


Hemoglobin  15.2 12.0-16.0  g/dL


 


Hematocrit  44.7 37-47  %


 


Mean Corpuscular Volume  93.5   fL


 


Mean Corpuscular Hemoglobin  31.8 25-34  pg


 


Mean Corpuscular Hemoglobin


Concent 


  34.0


  32-36  g/dl


 


 


RDW Standard Deviation  47.7 36.4-46.3  fL


 


RDW Coefficient of Variation  14.1 11.5-14.5  %


 


Platelet Count  274 130-400  K/uL


 


Mean Platelet Volume  11.5 7.4-10.4  fL


 


Sodium Level  143 136-145  mmol/L


 


Potassium Level  4.1 3.5-5.1  mmol/L


 


Chloride Level  108   mmol/L


 


Carbon Dioxide Level  26 21-32  mmol/L


 


Anion Gap  9.0 3-11  mmol/L


 


Blood Urea Nitrogen  19 7-18  mg/dl


 


Creatinine


  


  0.79


  0.60-1.20


mg/dl


 


Est Creatinine Clear Calc


Drug Dose 


  58.7


   ml/min


 


 


Estimated GFR (


American) 


  84.9


   


 


 


Estimated GFR (Non-


American 


  73.2


   


 


 


BUN/Creatinine Ratio  24.3 10-20  


 


Random Glucose  104 70-99  mg/dl


 


Calcium Level  8.9 8.5-10.1  mg/dl


 


Phosphorus Level  3.1 2.5-4.9  mg/dl


 


Magnesium Level  2.3 1.8-2.4  mg/dl

## 2018-01-15 NOTE — PROGRESS NOTE
DATE: 01/15/2018

 

SUBJECTIVE:  Satnam looks even better today.  There is no real drift to the

left upper extremity.  There is no neglect.  There may be a left upper motor

neuron facial asymmetry and the left leg is functionally working well.  I saw

her yesterday, I refer the reader to that notation ____ walking with a walker

was a little slow and deliberate, she clearly needs a little time, probably

in a rehabilitation type facility.  It looks as though she is going to be off

to [facility], perhaps tomorrow for a week or two and then will be back in

her home environment.

 

For now, we do not have a clearcut source for the embolic event that involves

her right hemisphere.  She has recovered nicely from this.  She is going to

need aspirin and Plavix.  We are going to see her on an outpatient basis in

our office within about 3 weeks and schedule her then for the outpatient

CardioNet and/or Zio patch to monitor to see if she does ____ in and out of

atrial fibrillation.  She certainly has some potential embolic sources in the

calcified mitral annulus in aortic valve, but we would not treat these with

anything other than antiplatelet therapy and the only change would be if we

found evidence for paroxysmal atrial fibrillation, at which time either a

novel anticoagulant for Coumadin would have to be considered.  All of these

decisions remain in the future.  Neurology is going to sign off her case at

this point.

## 2018-01-16 VITALS
OXYGEN SATURATION: 96 % | HEART RATE: 60 BPM | SYSTOLIC BLOOD PRESSURE: 127 MMHG | TEMPERATURE: 98.24 F | DIASTOLIC BLOOD PRESSURE: 69 MMHG

## 2018-01-16 VITALS
SYSTOLIC BLOOD PRESSURE: 127 MMHG | DIASTOLIC BLOOD PRESSURE: 69 MMHG | HEART RATE: 60 BPM | OXYGEN SATURATION: 96 % | TEMPERATURE: 98.24 F

## 2018-01-16 VITALS — OXYGEN SATURATION: 97 %

## 2018-01-16 RX ADMIN — PANTOPRAZOLE SCH MG: 40 TABLET, DELAYED RELEASE ORAL at 07:49

## 2018-01-16 RX ADMIN — GABAPENTIN SCH MG: 300 CAPSULE ORAL at 07:50

## 2018-01-16 RX ADMIN — METOPROLOL SUCCINATE SCH MG: 25 TABLET, EXTENDED RELEASE ORAL at 07:49

## 2018-01-16 RX ADMIN — ATORVASTATIN CALCIUM SCH MG: 40 TABLET, FILM COATED ORAL at 07:51

## 2018-01-16 RX ADMIN — ENOXAPARIN SODIUM SCH MG: 40 INJECTION SUBCUTANEOUS at 07:52

## 2018-01-16 RX ADMIN — DOCUSATE SODIUM SCH MG: 100 CAPSULE, LIQUID FILLED ORAL at 07:50

## 2018-01-16 RX ADMIN — Medication SCH MG: at 07:51

## 2018-01-16 RX ADMIN — CLOPIDOGREL BISULFATE SCH MG: 75 TABLET, FILM COATED ORAL at 07:50

## 2018-01-16 NOTE — PROGRESS NOTE
Internal Med Progress Note


Date of Service:


Jan 16, 2018.


Provider Documentation:





SUBJECTIVE:





The patient was seen and examined 


Admitted with Stroke symptoms and some nonspecific back pain 


Received TPA 


Improving daily  and getting PT





OBJECTIVE:





Vital Signs-as noted below





Exam:


General-No distress at rest


Eyes-normal


ENT-normal


Neck-supple


Lungs-Clear to ausucltate bilaterally 


Heart-Regular,no murmur appreciated


Abdomen-Benign,no masses,bowel sound present 


Extremities-No edema 


Neuro-AAOx3


No dysarthria


No facial asymmetry


Generally weak


No focal neuro deficit














CT Head:


No acute intracranial abnormality. Mild age-related atrophy and chronic small


vessel change





CXR:


1. Mild diffuse interstitial thickening which could be chronic.


2. The heart remains top normal in size.





CT dissection:


No evidence for aneurysm or dissection.


2. Moderate atherosclerotic change thoracic aorta.


3. Slight generalized interstitial prominence throughout both hemithoraces


raising the possibility of a mild diffuse interstitial pneumonitis.








CT angio Brain:


1. There is no hemorrhage, mass effect, or evidence of acute territorial


ischemia by CT criteria.


2. Unremarkable CT angiogram of the brain.





CTA neck:


No significant stenosis, occlusion, or dissection identified within the carotid


or vertebral arteries. Incidental is made of a high right carotid bifurcation.





ECHO:


Left ventricular systolic function is normal.


  *  Ejection Fraction = 65-70%.


  *  There is mild concentric left ventricular hypertrophy.


  *  The left atrium is moderately dilated.


  *  There is severe mitral annular calcification.


  *  There is mild mitral regurgitation.


  *  Aortic valve sclerosis moderate, without significant aortic valvular 

stenosis.


  *  Grade I diastolic dysfunction, (abnormal relaxation pattern).





MRI::IMPRESSION:  Multiple small foci of acute infarction within the right 

frontal


and parietal lobes, measuring up to 2.1 cm. No significant mass effect. No


evidence for hemorrhagic conversion.


Repeat CT of the Head-negative for any Hemorrhage





ASSESSMENT & PLAN:








Acute CVA: S/P tPA


Admitted  in ICU 


CT head: showed no acute pathology


CTA Head, neck: No acute process


CT dissection: Negative


Speech and swallow eval-passed 


Continue Lipitor


Allow permissive HTN in setting of acute CVA 


Repeat CT head in AM and MRI as above 


Has been started on Plavix and Aspirin 


Clinically much better and neurologically improved a lot


Plavix and Aspirin for now ,OP Zio patch to document any AF 


If any AF ,will need to change Anticoagulation 


Continue PT/OT -will need rehab for a short term


Patient is reluctant but after discussion agreed to it 


Much improved today and ready to be discharged  


 


Back Pain and Neck pain-stable 


H/O chronic back pain


CT dissection: Negative


Will get X ray of spine-Has OA ,otherwise nothing significant


Pain control


PT/OT-continued


No more pain





Abnormal ECG:with significant increase in Troponin


ST -T wave changes likely secondary to CVA


No regional wall motion abnormalities noted on ECHO


Troponin:Second set is significantly high at 10


Cardiology consulted-appreciate input 


Troponin is trending down


No cardiac symptoms





 


Leukocytosis/Lactic acidosis:


No obvious source of infection


Normal Procalcitonin


CXR:Mild diffuse interstitial thickening which could be chronic


UA: no signs of UTI


IV fluids


Repeat Lactate levels


No Abx for now


Remains stable 








PVD/ HLP:


Hold Aspirin-restarted


Continue Lipitor








HTN:


Hold HTN meds for permissive HTN


HTN controlled 








GERD


Continue PPI





CKD III


Cr at  baseline


monitor renal function





Ongoing Tobacco use:


 to quit smoking








DVT Px:


SCDs Re: tPA





Code Status:


Full Code


Will transfer to [facility]


Continue PT/Ot 


Accepted to [facility]


Vital Signs:











  Date Time  Temp Pulse Resp B/P (MAP) Pulse Ox O2 Delivery O2 Flow Rate FiO2


 


1/16/18 08:00      Room Air  


 


1/16/18 07:45 36.8 60 18 127/69 (88) 96 Room Air  


 


1/16/18 00:00     97 Room Air  


 


1/15/18 23:37 36.6 64 17 108/61 (77) 97 Room Air  


 


1/15/18 16:00     97 Room Air  


 


1/15/18 14:51 36.8 59 20 111/55 (73) 97 Room Air  








Lab Results:





Results Past 24 Hours








Test


  1/15/18


16:27 Range/Units


 


 


Bedside Glucose 95 70-90  mg/dl

## 2018-01-16 NOTE — DISCHARGE SUMMARY
Discharge Summary


Date of Service


Jan 16, 2018.





Discharge Summary


Admission Date:


Lopez 10, 2018 at 15:08


Discharge Date:  Jan 16, 2018


Discharge Disposition:  Rehab


Principal Diagnosis:


 Stroke,S/P TPA


Secondary Diagnoses/Problems:


Please see H&P and Hospital Progress note


Consultations:


Neurology and Intensivist





Medication Reconciliation


New Medications:  


Atorvastatin (Lipitor) 40 Mg Tab


40 MG PO QAM for 30 Days, #30 TAB





Clopidogrel Bisulfate (Clopidogrel) 75 Mg Tab


75 MG PO QAM for 30 Days, #30 TAB





Oxycodone/Acetaminophen 5MG/325MG (Percocet 5MG/325MG)  Tab


1 TAB PO Q4H PRN for Pain for 10 Days, #30 TAB


PAIN


 


Continued Medications:  


Acetaminophen (Acetaminophen) 325 Mg Tab


650 MG PO Q4H PRN for Mild Pain


AS NEEDED FOR PAIN RATED AS 1-3 ON A SCALE OF "0-10". DO NOT EXCEED 3


 GM APAP/24 HOURS.


Albuterol Hfa (Ventolin Hfa) 200 Puffs/53568 Mcg Aers


2-4 PUFFS INH Q6H PRN for SOB/Wheezing, #1 INHALER





Amlodipine (Norvasc) 10 Mg Tab


5 MG PO DAILY





Aspirin (Aspirin Ec) 81 Mg Tab


81 MG PO DAILY





Folic Acid (Folvite) 1 Mg Tab


1 MG PO DAILY, TAB





Gabapentin (Neurontin) 300 Mg Cap


300-600 MG PO TID





Metoprolol Succinate (Metoprolol Succinate ER) 25 Mg Tabcr


25 MG PO QAM for 30 Days, #30 TAB





Multiple Vitamins W/ Minerals (Thera-M) 1 Tab Tab


1 TAB PO DAILY





Omeprazole (Prilosec) 20 Mg Capcr


20 MG PO DAILY





Simvastatin (Zocor) 40 Mg Tab


40 MG PO QPM, TAB


TAKE THIS MEDICATION WITH EVENING MEAL


 


Discontinued Medications:  


Lisinopril (Zestril) 20 Mg Tab


40 MG PO TID











Admission Information


HPI (per Admitting provider):


Patient is a 77 yr female with PMH of PVD, HLP, HTN, GERD, CKD III and other 

problems presents with history of back pain and LUE weakness which started 

today. Patient reports that she was trying bend over to  a tissue from 

the floor and developed sudden onset of severe back pain in the middle of her 

back and also noticed to have Left hand weakness at around 9.30am. She sates 

that she was not able to move her fingers and "I felt funny". Patient states 

back pain is non radiating, dull, constant which improved while in ED and has 

history of chronic back pain. She reports nausea but denies vomiting. Patient 

had CT head which showed no acute findings and ED physician discussed with  

[facility] neurology  and patient received tPA while in ED. 

Patient later showed some improvement of LUE weakness post tPA. Denies any 

history of chest pain, SOB, dizziness, cough, fever, chills, fall, head trauma, 

LOC, headache, change in vision, double/blurry vision, vertigo, slurred speech, 

facial deformity, bowel/bladder incontinence, abdominal pain, diarrhea, 

dysuria. She is on Aspirin at home and admits to taking it today     








Past Medical/Surgical History


Medical Problems:


(1) Dyslipidemia


Status: Chronic  





(2) Gastro-esophageal reflux


Status: Chronic  





(3) HTN (hypertension)


Status: Chronic  





(4) Hypertension


Status: Chronic  





(5) PAD (peripheral artery disease)


Status: Chronic  





(6) Peripheral vascular disease


Status: Chronic  





Surgical Problems:


(1) H/O bilateral oophorectomy


Status: Resolved  





(2) H/O left breast biopsy


Status: Resolved  





(3) H/O oophorectomy


Status: Resolved  





(4) History of appendectomy


Status: Resolved  





(5) History of cataract surgery


Status: Resolved  





(6) History of colonoscopy


Status: Resolved  





(7) Hx of appendectomy


Status: Resolved  





(8) S/P femoral-popliteal bypass surgery


Status: Resolved  








Family History





Asthma


  BROTHER


FH: CAD (coronary artery disease)


  FATHER


FH: CVA (cerebrovascular accident)


FH: arthritis


  FATHER


  MOTHER


  BROTHER


  SISTER


FH: cancer


  SISTER (esophageal)


FH: diabetes mellitus


  BROTHER


FH: esophageal cancer


Stroke


  FATHER


  MOTHER


Reviewed





Social History


Smoking Status:  Current Every Day Smoker


Alcohol Use:  none


Drug Use:  none


Marital Status:  


Housing status:  lives with family


Occupational Status:  retired





Immunizations


History of Influenza Vaccine:  Yes


Influenza Vaccine Date:  Jan 6, 2013


History of Tetanus Vaccine?:  Yes


Tetanus Immunization Date:  May 14, 2002


History of Pneumococcal:  Yes


Pneumococcal Date:  Jun 6, 2013


History of Hepatitis B Vaccine:  Unknown


Hepatitis Immunization Date:  May 14, 2002





Multi-Drug Resistant Organisms


History of MDRO:  No





Allergies


Coded Allergies:  


     Adhesives (Verified  Allergy, Unknown, ., 1/10/18)


     Benzoin (Verified  Allergy, Unknown, ., 1/10/18)


     Hydrogen Peroxide (Verified  Allergy, Unknown, ., 1/10/18)


     Ceftriaxone (Unverified  Adverse Reaction, Unknown, burning at site of 

injection, 1/10/18)





Home Medications


Scheduled


Amlodipine (Norvasc), 5 MG PO DAILY


Aspirin (Aspirin Ec), 81 MG PO DAILY


Folic Acid (Folvite), 1 MG PO DAILY


Gabapentin (Neurontin), 300-600 MG PO TID


Lisinopril (Zestril), 40 MG PO TID


Metoprolol Succinate (Metoprolol Succinate ER), 25 MG PO QAM


Multiple Vitamins W/ Minerals (Thera-M), 1 TAB PO DAILY


Omeprazole (Prilosec), 20 MG PO DAILY


Simvastatin (Zocor), 40 MG PO QPM





Scheduled PRN


Acetaminophen (Acetaminophen), 650 MG PO Q4H PRN for Mild Pain


Albuterol Hfa (Ventolin Hfa), 2-4 PUFFS INH Q6H PRN for SOB/Wheezing





Review of Systems


See HPI for pertinent positives & negatives. A total of 10 systems reviewed and 

were otherwise negative.





 Physical Ex - H&P 


Physical Exam


Vital Signs











  Date Time  Temp Pulse Resp B/P (MAP) Pulse Ox O2 Delivery O2 Flow Rate FiO2


 


1/10/18 13:45  82 16 130/80 98 Nasal Cannula 3.0 


 


1/10/18 13:30  85 14 159/80 96 Nasal Cannula 3.0 


 


1/10/18 13:28     87 Room Air  


 


1/10/18 13:15  87 15 168/92 95 Room Air  


 


1/10/18 13:04  74      


 


1/10/18 13:00  75 17 176/103 96 Room Air  


 


1/10/18 12:45  77 17 167/79 94 Room Air  


 


1/10/18 12:31  77 14 165/78 94 Room Air  


 


1/10/18 12:22  84 30 166/82 94 Room Air  


 


1/10/18 12:05  83 16 145/79 93 Room Air  


 


1/10/18 12:00  80 22 159/109 92 Room Air  


 


1/10/18 11:43     93 Room Air  


 


1/10/18 11:35  86 18 148/100 93 Room Air  


 


1/10/18 10:52  85      


 


1/10/18 10:48 37.0 77 18 154/81 98 Room Air  








General Appearance:  WD/WN, no apparent distress


Head:  normocephalic, atraumatic


Eyes:  normal inspection, PERRL, EOMI, sclerae normal


ENT:  normal ENT inspection, hearing grossly normal


Neck:  supple, trachea midline


Respiratory/Chest:  chest non-tender, lungs clear, no respiratory distress, no 

accessory muscle use, + decreased breath sounds


Cardiovascular:  regular rate, rhythm, no edema, no murmur


Abdomen/GI:  normal bowel sounds, non tender, soft


Back:  + pertinent finding (Mid thoracic tenderness of spine)


Extremities/Musculoskelatal:  normal inspection, no pedal edema


Neurologic/Psych:  CNs II-XII nml as tested, alert, normal mood/affect, 

oriented x 3, + pertinent finding (Left upper extrtemity weakness, sensation 

intact)


Skin:  normal color, warm/dry





 Diagnostics - H&P 


Diagnostics


Laboratory Results





Results Past 24 Hours








Test


  1/10/18


11:14 1/10/18


11:51 1/10/18


13:05 Range/Units


 


 


Bedside Prothrombin Time INR 1.1   0.9-1.1  


 


White Blood Count  20.39  4.8-10.8  K/uL


 


Red Blood Count  4.43  4.2-5.4  M/uL


 


Hemoglobin  13.9  12.0-16.0  g/dL


 


Hematocrit  41.6  37-47  %


 


Mean Corpuscular Volume  93.9    fL


 


Mean Corpuscular Hemoglobin  31.4  25-34  pg


 


Mean Corpuscular Hemoglobin


Concent 


  33.4


  


  32-36  g/dl


 


 


Platelet Count  279  130-400  K/uL


 


Mean Platelet Volume  11.4  7.4-10.4  fL


 


Neutrophils (%) (Auto)  73.9   %


 


Lymphocytes (%) (Auto)  6.3   %


 


Monocytes (%) (Auto)  19.0   %


 


Eosinophils (%) (Auto)  0.2   %


 


Basophils (%) (Auto)  0.2   %


 


Neutrophils # (Auto)  15.06  1.4-6.5  K/uL


 


Lymphocytes # (Auto)  1.28  1.2-3.4  K/uL


 


Monocytes # (Auto)  3.87  0.11-0.59  K/uL


 


Eosinophils # (Auto)  0.05  0-0.5  K/uL


 


Basophils # (Auto)  0.04  0-0.2  K/uL


 


RDW Standard Deviation  49.8  36.4-46.3  fL


 


RDW Coefficient of Variation  14.5  11.5-14.5  %


 


Immature Granulocyte % (Auto)  0.4   %


 


Immature Granulocyte # (Auto)  0.09  0.00-0.02  K/uL


 


Hypersegmented Polys  1+   


 


Prothrombin Time


  


  10.7


  


  9.0-12.0


SECONDS


 


Prothromb Time International


Ratio 


  1.0


  


  0.9-1.1  


 


 


Activated Partial


Thromboplast Time 


  29.8


  


  21.0-31.0


SECONDS


 


Partial Thromboplastin Ratio  1.1   


 


Sodium Level  137  136-145  mmol/L


 


Potassium Level  4.4  3.5-5.1  mmol/L


 


Chloride Level  103    mmol/L


 


Carbon Dioxide Level  29  21-32  mmol/L


 


Anion Gap  5.0  3-11  mmol/L


 


Blood Urea Nitrogen  16  7-18  mg/dl


 


Creatinine


  


  0.85


  


  0.60-1.20


mg/dl


 


Est Creatinine Clear Calc


Drug Dose 


  56.5


  


   ml/min


 


 


Estimated GFR (


American) 


  77.7


  


   


 


 


Estimated GFR (Non-


American 


  67.0


  


   


 


 


BUN/Creatinine Ratio  18.9  10-20  


 


Random Glucose  142  70-99  mg/dl


 


Calcium Level  8.9  8.5-10.1  mg/dl


 


Magnesium Level  2.1  1.8-2.4  mg/dl


 


Total Creatine Kinase  32    U/L


 


Creatine Kinase MB  0.5  0.5-3.6  ng/ml


 


Creatine Kinase MB Ratio  1.6  0-3.0  


 


Troponin I  0.031  0-0.045  ng/ml


 


Urine Color   YELLOW  


 


Urine Appearance   CLEAR CLEAR  


 


Urine pH   8.0 4.5-7.5  


 


Urine Specific Gravity   1.038 1.000-1.030  


 


Urine Protein   NEG NEG  


 


Urine Glucose (UA)   NEG NEG  


 


Urine Ketones   NEG NEG  


 


Urine Occult Blood   1+ NEG  


 


Urine Nitrite   NEG NEG  


 


Urine Bilirubin   NEG NEG  


 


Urine Urobilinogen   NEG NEG  


 


Urine Leukocyte Esterase   NEG NEG  


 


Urine WBC (Auto)   0 0-5  /hpf


 


Urine RBC (Auto)   10-30 0-4  /hpf


 


Urine Hyaline Casts (Auto)   0 0-5  /lpf


 


Urine Epithelial Cells (Auto)   20-30 0-5  /lpf


 


Urine Bacteria (Auto)   NEG NEG  











Diagnostic Radiology


CT Head:


No acute intracranial abnormality. Mild age-related atrophy and chronic small


vessel change





CXR:


1. Mild diffuse interstitial thickening which could be chronic.


2. The heart remains top normal in size.





CT dissection:


No evidence for aneurysm or dissection.


2. Moderate atherosclerotic change thoracic aorta.


3. Slight generalized interstitial prominence throughout both hemithoraces


raising the possibility of a mild diffuse interstitial pneumonitis.








CT angio Brain:


1. There is no hemorrhage, mass effect, or evidence of acute territorial


ischemia by CT criteria.


2. Unremarkable CT angiogram of the brain.





CTA neck:


No significant stenosis, occlusion, or dissection identified within the carotid


or vertebral arteries. Incidental is made of a high right carotid bifurcation.





ECHO:


Left ventricular systolic function is normal.


  *  Ejection Fraction = 65-70%.


  *  There is mild concentric left ventricular hypertrophy.


  *  The left atrium is moderately dilated.


  *  There is severe mitral annular calcification.


  *  There is mild mitral regurgitation.


  *  Aortic valve sclerosis moderate, without significant aortic valvular 

stenosis.


  *  Grade I diastolic dysfunction, (abnormal relaxation pattern).





EKG


EKG:


NSR, Peaked T waves in I, II, V3, V4, V5, Non specific ST changes in inferior 

lead





 Impression - H&P 


Impression


Assessment and Plan








Acute CVA: S/P tPA


Admit in ICU 


CT head: showed no acute pathology


CTA Head, neck: No acute process


CT dissection: Negative


Stroke work up including lipid panel, A1C, MRI Brain, ECHO


Speech and swallow eval


Hold aspirin as S/P tPA


Continue Lipitor


Neuro checks, Neurology/Intensivist consulted


PT/OT    


Allow permissive HTN in setting of acute CVA 


Repeat CT head in AM


Avoid antiplatelets, anticoagulants for now 











Back Pain:


H/O chronic back pain


CT dissection: Negative


Will get X ray of spine


Pain control


PT/OT


Consider CT if necessary





 


Leukocytosis/Lactic acidosis:


No obvious source of infection


Normal Procalcitonin


CXR:Mild diffuse interstitial thickening which could be chronic


UA: no signs of UTI


IV fluids


Repeat Lactate levels


No Abx for now








Abnormal ECG:


ST -T wave changes likely secondary to CVA


No regional wall motion abnormalities noted on ECHO


Troponin: negative


Trend cardiac enzymes





PVD/ HLP:


Hold Aspirin


Continue Lipitor








HTN:


Hold HTN meds for permissive HTN








GERD


Continue PPI





CKD III


Cr at  baseline


monitor renal function





Ongoing Tobacco use:


 to quit smoking








DVT Px:


SCDs Re: tPA





Code Status:


Full Code





Disposition:


Admit in ICU


Physical Exam (per Admitting):  


   General Appearance:  WD/WN, no apparent distress


   Head:  normocephalic, atraumatic


   Eyes:  normal inspection, PERRL, EOMI, sclerae normal


   ENT:  normal ENT inspection, hearing grossly normal


   Neck:  supple, trachea midline


   Respiratory/Chest:  chest non-tender, lungs clear, no respiratory distress, 

no accessory muscle use, + decreased breath sounds


   Cardiovascular:  regular rate, rhythm, no edema, no murmur


   Abdomen/GI:  normal bowel sounds, non tender, soft


   Back:  + pertinent finding (Mid thoracic tenderness of spine)


   Extremities/Musculoskelatal:  normal inspection, no pedal edema


   Neurologic/Psych:  CNs II-XII nml as tested, alert, normal mood/affect, 

oriented x 3, + pertinent finding (Left upper extrtemity weakness, sensation 

intact)


   Skin:  normal color, warm/dry





Hospital Course








Acute CVA: S/P tPA


Admitted  in ICU 


CT head: showed no acute pathology


CTA Head, neck: No acute process


CT dissection: Negative


Speech and swallow eval-passed 


Continue Lipitor


Allow permissive HTN in setting of acute CVA 


Repeat CT head in AM and MRI as above 


Has been started on Plavix and Aspirin 


Clinically much better and neurologically improved a lot


Plavix and Aspirin for now ,OP Zio patch to document any AF 


If any AF ,will need to change Anticoagulation 


Continue PT/OT -will need rehab for a short term


Patient is reluctant but after discussion agreed to it 


Much improved today and ready to be discharged  


 


Back Pain and Neck pain-stable 


H/O chronic back pain


CT dissection: Negative


Will get X ray of spine-Has OA ,otherwise nothing significant


Pain control


PT/OT-continued


No more pain





Abnormal ECG:with significant increase in Troponin


ST -T wave changes likely secondary to CVA


No regional wall motion abnormalities noted on ECHO


Troponin:Second set is significantly high at 10


Cardiology consulted-appreciate input 


Troponin is trending down


No cardiac symptoms





 


Leukocytosis/Lactic acidosis:


No obvious source of infection


Normal Procalcitonin


CXR:Mild diffuse interstitial thickening which could be chronic


UA: no signs of UTI


IV fluids


Repeat Lactate levels


No Abx for now


Remains stable 








PVD/ HLP:


Hold Aspirin-restarted


Continue Lipitor








HTN:


Hold HTN meds for permissive HTN


HTN controlled 








GERD


Continue PPI





CKD III


Cr at  baseline


monitor renal function





Ongoing Tobacco use:


 to quit smoking








DVT Px:


SCDs Re: tPA





Code Status:


Full Code


Will transfer to [facility]


Continue PT/Ot 


Accepted to [facility]


Total time spent on discharge = 35 minutes


This includes examination of the patient, discharge planning, medication 

reconciliation, and communication with other providers.





Discharge Instructions


Date of Service


Jan 16, 2018.





Admission


Reason for Admission:  Lue Weakness





Discharge


Discharge Diagnosis / Problem:  Stroke,S/P TPA





Discharge Goals


Goal(s):  Prevent Disease Progression





Activity Recommendations


Activity Level:  Assistance Required


Therapies:  Physical Therapy, Occupational Therapy





.





Additional Information


Patient informed of condition:  Yes


Advance Directives:  No


DNR:  No


Level of Care:  Skilled


Communicable Disease:  No


Prognosis:  Improving


Nobles Catheter:  Yes





Instructions / Follow-Up


Instructions / Follow-Up


Please amke an appointment with your PCP in 1 week,Neurology with Dr Reese in 2

-3 weeks





Current Hospital Diet


Patient's current hospital diet: AHA Diet (Heart Healthy)





Discharge Diet


Recommended Diet:  AHA Diet (Heart Healthy)





Pending Studies


Studies pending at discharge:  no





Laboratory Results





Hemoglobin A1c








Test


  1/10/18


11:51 Range/Units


 


 


Estimated Average Glucose 126   mg/dl


 


Hemoglobin A1c 6.0 H 4.5-5.6  %








Lipid Panel








Test


  1/11/18


13:31 Range/Units


 


 


Triglycerides Level 129  0-150  mg/dl


 


Cholesterol Level 138  0-200  mg/dl


 


HDL Cholesterol 29   mg/dl


 


Cholesterol/HDL Ratio 4.8   


 


LDL Cholesterol, Calculated 83   mg/dl











Medical Emergencies








.


Who to Call and When:





Medical Emergencies:  If at any time you feel your situation is an emergency, 

please call 911 immediately.





.





Non-Emergent Contact


Non-Emergency issues call your:  Primary Care Provider





.





Past History


Medical & Surgical History:  


(1) CVA (cerebral vascular accident)


(2) EKG, abnormal


(3) Peripheral vascular disease


(4) Hypertension


(5) HTN (hypertension)


(6) PAD (peripheral artery disease)


(7) Hx of appendectomy


(8) H/O oophorectomy


(9) S/P femoral-popliteal bypass surgery


(10) History of cataract surgery


.








"Provider Documentation" section prepared by Kevan Cortes.








.





Core Measure Problem


Core Measures:  None











<Electronically signed by Kevan Cortes M.D.>





  Signed:  01/16/18 1816





Additional Copies To


ANGELIQUE Burgess M.D. (MEDICAL)

## 2018-01-16 NOTE — DISCHARGE INSTRUCTIONS
Discharge Instructions


Date of Service


Jan 16, 2018.





Admission


Reason for Admission:  Lue Weakness





Discharge


Discharge Diagnosis / Problem:  Stroke,S/P TPA





Discharge Goals


Goal(s):  Prevent Disease Progression





Activity Recommendations


Activity Level:  Assistance Required


Therapies:  Physical Therapy, Occupational Therapy





.





Additional Information


Patient informed of condition:  Yes


Advance Directives:  No


DNR:  No


Level of Care:  Skilled


Communicable Disease:  No


Prognosis:  Improving


Nobles Catheter:  Yes





Instructions / Follow-Up


Instructions / Follow-Up


Please amke an appointment with your PCP in 1 week,Neurology with Dr Reese in 2

-3 weeks





Current Hospital Diet


Patient's current hospital diet: AHA Diet (Heart Healthy)





Discharge Diet


Recommended Diet:  AHA Diet (Heart Healthy)





Pending Studies


Studies pending at discharge:  no





Laboratory Results





Hemoglobin A1c








Test


  1/10/18


11:51 Range/Units


 


 


Estimated Average Glucose 126   mg/dl


 


Hemoglobin A1c 6.0 H 4.5-5.6  %








Lipid Panel








Test


  1/11/18


13:31 Range/Units


 


 


Triglycerides Level 129  0-150  mg/dl


 


Cholesterol Level 138  0-200  mg/dl


 


HDL Cholesterol 29   mg/dl


 


Cholesterol/HDL Ratio 4.8   


 


LDL Cholesterol, Calculated 83   mg/dl











Medical Emergencies








.


Who to Call and When:





Medical Emergencies:  If at any time you feel your situation is an emergency, 

please call 911 immediately.





.





Non-Emergent Contact


Non-Emergency issues call your:  Primary Care Provider





.





Past History


Medical & Surgical History:  


(1) CVA (cerebral vascular accident)


(2) EKG, abnormal


(3) Peripheral vascular disease


(4) Hypertension


(5) HTN (hypertension)


(6) PAD (peripheral artery disease)


(7) Hx of appendectomy


(8) H/O oophorectomy


(9) S/P femoral-popliteal bypass surgery


(10) History of cataract surgery


.








"Provider Documentation" section prepared by Kevan Cortes.








.





Core Measure Problem


Core Measures:  None